# Patient Record
Sex: FEMALE | URBAN - METROPOLITAN AREA
[De-identification: names, ages, dates, MRNs, and addresses within clinical notes are randomized per-mention and may not be internally consistent; named-entity substitution may affect disease eponyms.]

---

## 2024-03-11 ENCOUNTER — NEW PATIENT COMPREHENSIVE (OUTPATIENT)
Dept: URBAN - METROPOLITAN AREA CLINIC 102 | Facility: CLINIC | Age: 59
End: 2024-03-11

## 2024-03-11 DIAGNOSIS — H40.033: ICD-10-CM

## 2024-03-11 DIAGNOSIS — H52.03: ICD-10-CM

## 2024-03-11 DIAGNOSIS — H04.123: ICD-10-CM

## 2024-03-11 DIAGNOSIS — H25.13: ICD-10-CM

## 2024-03-11 PROCEDURE — 92015 DETERMINE REFRACTIVE STATE: CPT

## 2024-03-11 PROCEDURE — 99204 OFFICE O/P NEW MOD 45 MIN: CPT

## 2024-03-11 ASSESSMENT — TONOMETRY
OD_IOP_MMHG: 25
OS_IOP_MMHG: 22

## 2024-03-11 ASSESSMENT — VISUAL ACUITY
OD_CC: 20/40+2
OS_CC: 20/30
OU_CC: 20/30

## 2024-10-14 ENCOUNTER — HOSPITAL ENCOUNTER (INPATIENT)
Facility: HOSPITAL | Age: 59
LOS: 7 days | Discharge: NURSING, REHAB OR LONG-TERM CARE | DRG: 682 | End: 2024-10-21
Attending: EMERGENCY MEDICINE | Admitting: INTERNAL MEDICINE
Payer: MEDICARE

## 2024-10-14 ENCOUNTER — APPOINTMENT (OUTPATIENT)
Facility: HOSPITAL | Age: 59
DRG: 682 | End: 2024-10-14
Payer: MEDICARE

## 2024-10-14 DIAGNOSIS — N17.9 AKI (ACUTE KIDNEY INJURY) (HCC): ICD-10-CM

## 2024-10-14 DIAGNOSIS — R62.7 FAILURE TO THRIVE IN ADULT: Primary | ICD-10-CM

## 2024-10-14 PROBLEM — E86.0 DEHYDRATION: Status: ACTIVE | Noted: 2024-10-14

## 2024-10-14 PROBLEM — R10.9 ABDOMINAL PAIN: Status: ACTIVE | Noted: 2024-10-14

## 2024-10-14 PROBLEM — R11.2 NAUSEA AND VOMITING: Status: ACTIVE | Noted: 2024-10-14

## 2024-10-14 PROBLEM — E87.20 ACIDOSIS: Status: ACTIVE | Noted: 2024-10-14

## 2024-10-14 PROBLEM — R63.4 WEIGHT LOSS: Status: ACTIVE | Noted: 2024-10-14

## 2024-10-14 PROBLEM — C53.9 CERVICAL CANCER (HCC): Status: ACTIVE | Noted: 2024-10-14

## 2024-10-14 PROBLEM — E87.1 HYPONATREMIA: Status: ACTIVE | Noted: 2024-10-14

## 2024-10-14 PROBLEM — D72.829 LEUKOCYTOSIS: Status: ACTIVE | Noted: 2024-10-14

## 2024-10-14 PROBLEM — R53.81 DEBILITY: Status: ACTIVE | Noted: 2024-10-14

## 2024-10-14 PROBLEM — Z98.890 HISTORY OF UROSTOMY: Status: ACTIVE | Noted: 2024-10-14

## 2024-10-14 PROBLEM — N39.0 UTI (URINARY TRACT INFECTION): Status: ACTIVE | Noted: 2024-10-14

## 2024-10-14 LAB
ALBUMIN SERPL-MCNC: 2.9 G/DL (ref 3.5–5)
ALBUMIN/GLOB SERPL: 0.5 (ref 1.1–2.2)
ALP SERPL-CCNC: 120 U/L (ref 45–117)
ALT SERPL-CCNC: 47 U/L (ref 12–78)
ANION GAP SERPL CALC-SCNC: 10 MMOL/L (ref 2–12)
ANION GAP SERPL CALC-SCNC: 15 MMOL/L (ref 2–12)
APPEARANCE UR: ABNORMAL
AST SERPL-CCNC: 29 U/L (ref 15–37)
BACTERIA URNS QL MICRO: ABNORMAL /HPF
BASOPHILS # BLD: 0 K/UL (ref 0–0.1)
BASOPHILS NFR BLD: 0 % (ref 0–1)
BILIRUB SERPL-MCNC: 0.4 MG/DL (ref 0.2–1)
BILIRUB UR QL: NEGATIVE
BUN SERPL-MCNC: 63 MG/DL (ref 6–20)
BUN SERPL-MCNC: 66 MG/DL (ref 6–20)
BUN/CREAT SERPL: 47 (ref 12–20)
BUN/CREAT SERPL: 53 (ref 12–20)
CALCIUM SERPL-MCNC: 11.1 MG/DL (ref 8.5–10.1)
CALCIUM SERPL-MCNC: 9.8 MG/DL (ref 8.5–10.1)
CHLORIDE SERPL-SCNC: 100 MMOL/L (ref 97–108)
CHLORIDE SERPL-SCNC: 106 MMOL/L (ref 97–108)
CO2 SERPL-SCNC: 18 MMOL/L (ref 21–32)
CO2 SERPL-SCNC: 20 MMOL/L (ref 21–32)
COLOR UR: ABNORMAL
COMMENT:: NORMAL
CREAT SERPL-MCNC: 1.2 MG/DL (ref 0.55–1.02)
CREAT SERPL-MCNC: 1.41 MG/DL (ref 0.55–1.02)
DIFFERENTIAL METHOD BLD: ABNORMAL
EOSINOPHIL # BLD: 0 K/UL (ref 0–0.4)
EOSINOPHIL NFR BLD: 0 % (ref 0–7)
EPITH CASTS URNS QL MICRO: ABNORMAL /LPF
ERYTHROCYTE [DISTWIDTH] IN BLOOD BY AUTOMATED COUNT: 13.4 % (ref 11.5–14.5)
GLOBULIN SER CALC-MCNC: 6.4 G/DL (ref 2–4)
GLUCOSE SERPL-MCNC: 118 MG/DL (ref 65–100)
GLUCOSE SERPL-MCNC: 97 MG/DL (ref 65–100)
GLUCOSE UR STRIP.AUTO-MCNC: NEGATIVE MG/DL
HCT VFR BLD AUTO: 40 % (ref 35–47)
HGB BLD-MCNC: 13.4 G/DL (ref 11.5–16)
HGB UR QL STRIP: NEGATIVE
HYALINE CASTS URNS QL MICRO: ABNORMAL /LPF (ref 0–5)
IMM GRANULOCYTES # BLD AUTO: 0.1 K/UL (ref 0–0.04)
IMM GRANULOCYTES NFR BLD AUTO: 1 % (ref 0–0.5)
KETONES UR QL STRIP.AUTO: NEGATIVE MG/DL
LEUKOCYTE ESTERASE UR QL STRIP.AUTO: ABNORMAL
LIPASE SERPL-CCNC: 37 U/L (ref 13–75)
LYMPHOCYTES # BLD: 1.3 K/UL (ref 0.8–3.5)
LYMPHOCYTES NFR BLD: 11 % (ref 12–49)
MAGNESIUM SERPL-MCNC: 2.3 MG/DL (ref 1.6–2.4)
MCH RBC QN AUTO: 29.3 PG (ref 26–34)
MCHC RBC AUTO-ENTMCNC: 33.5 G/DL (ref 30–36.5)
MCV RBC AUTO: 87.3 FL (ref 80–99)
MONOCYTES # BLD: 0.9 K/UL (ref 0–1)
MONOCYTES NFR BLD: 7 % (ref 5–13)
NEUTS SEG # BLD: 9.5 K/UL (ref 1.8–8)
NEUTS SEG NFR BLD: 81 % (ref 32–75)
NITRITE UR QL STRIP.AUTO: NEGATIVE
NRBC # BLD: 0 K/UL (ref 0–0.01)
NRBC BLD-RTO: 0 PER 100 WBC
PH UR STRIP: 5.5 (ref 5–8)
PLATELET # BLD AUTO: 548 K/UL (ref 150–400)
PMV BLD AUTO: 9.2 FL (ref 8.9–12.9)
POTASSIUM SERPL-SCNC: 3.4 MMOL/L (ref 3.5–5.1)
POTASSIUM SERPL-SCNC: 3.5 MMOL/L (ref 3.5–5.1)
PROT SERPL-MCNC: 9.3 G/DL (ref 6.4–8.2)
PROT UR STRIP-MCNC: ABNORMAL MG/DL
RBC # BLD AUTO: 4.58 M/UL (ref 3.8–5.2)
RBC #/AREA URNS HPF: ABNORMAL /HPF (ref 0–5)
SODIUM SERPL-SCNC: 133 MMOL/L (ref 136–145)
SODIUM SERPL-SCNC: 136 MMOL/L (ref 136–145)
SP GR UR REFRACTOMETRY: 1.01 (ref 1–1.03)
SPECIMEN HOLD: NORMAL
URINE CULTURE IF INDICATED: ABNORMAL
UROBILINOGEN UR QL STRIP.AUTO: 0.2 EU/DL (ref 0.2–1)
WBC # BLD AUTO: 11.8 K/UL (ref 3.6–11)
WBC URNS QL MICRO: ABNORMAL /HPF (ref 0–4)

## 2024-10-14 PROCEDURE — 6360000004 HC RX CONTRAST MEDICATION: Performed by: EMERGENCY MEDICINE

## 2024-10-14 PROCEDURE — 93005 ELECTROCARDIOGRAM TRACING: CPT | Performed by: EMERGENCY MEDICINE

## 2024-10-14 PROCEDURE — 96374 THER/PROPH/DIAG INJ IV PUSH: CPT

## 2024-10-14 PROCEDURE — 80053 COMPREHEN METABOLIC PANEL: CPT

## 2024-10-14 PROCEDURE — 1100000000 HC RM PRIVATE

## 2024-10-14 PROCEDURE — 74177 CT ABD & PELVIS W/CONTRAST: CPT

## 2024-10-14 PROCEDURE — 87088 URINE BACTERIA CULTURE: CPT

## 2024-10-14 PROCEDURE — 94761 N-INVAS EAR/PLS OXIMETRY MLT: CPT

## 2024-10-14 PROCEDURE — 96361 HYDRATE IV INFUSION ADD-ON: CPT

## 2024-10-14 PROCEDURE — 2580000003 HC RX 258: Performed by: EMERGENCY MEDICINE

## 2024-10-14 PROCEDURE — 2580000003 HC RX 258: Performed by: INTERNAL MEDICINE

## 2024-10-14 PROCEDURE — 36415 COLL VENOUS BLD VENIPUNCTURE: CPT

## 2024-10-14 PROCEDURE — 83690 ASSAY OF LIPASE: CPT

## 2024-10-14 PROCEDURE — 81001 URINALYSIS AUTO W/SCOPE: CPT

## 2024-10-14 PROCEDURE — 87086 URINE CULTURE/COLONY COUNT: CPT

## 2024-10-14 PROCEDURE — 6360000002 HC RX W HCPCS: Performed by: EMERGENCY MEDICINE

## 2024-10-14 PROCEDURE — 87186 SC STD MICRODIL/AGAR DIL: CPT

## 2024-10-14 PROCEDURE — 6360000002 HC RX W HCPCS: Performed by: INTERNAL MEDICINE

## 2024-10-14 PROCEDURE — 85025 COMPLETE CBC W/AUTO DIFF WBC: CPT

## 2024-10-14 PROCEDURE — 96375 TX/PRO/DX INJ NEW DRUG ADDON: CPT

## 2024-10-14 PROCEDURE — 99285 EMERGENCY DEPT VISIT HI MDM: CPT

## 2024-10-14 PROCEDURE — 83735 ASSAY OF MAGNESIUM: CPT

## 2024-10-14 RX ORDER — SODIUM CHLORIDE 0.9 % (FLUSH) 0.9 %
5-40 SYRINGE (ML) INJECTION PRN
Status: DISCONTINUED | OUTPATIENT
Start: 2024-10-14 | End: 2024-10-21 | Stop reason: HOSPADM

## 2024-10-14 RX ORDER — ONDANSETRON 4 MG/1
4 TABLET, ORALLY DISINTEGRATING ORAL EVERY 8 HOURS PRN
Status: DISCONTINUED | OUTPATIENT
Start: 2024-10-14 | End: 2024-10-21 | Stop reason: HOSPADM

## 2024-10-14 RX ORDER — LEVOFLOXACIN 5 MG/ML
500 INJECTION, SOLUTION INTRAVENOUS ONCE
Status: COMPLETED | OUTPATIENT
Start: 2024-10-14 | End: 2024-10-14

## 2024-10-14 RX ORDER — METOCLOPRAMIDE HYDROCHLORIDE 5 MG/ML
10 INJECTION INTRAMUSCULAR; INTRAVENOUS EVERY 6 HOURS
Status: DISCONTINUED | OUTPATIENT
Start: 2024-10-14 | End: 2024-10-15

## 2024-10-14 RX ORDER — SODIUM CHLORIDE 9 MG/ML
INJECTION, SOLUTION INTRAVENOUS PRN
Status: DISCONTINUED | OUTPATIENT
Start: 2024-10-14 | End: 2024-10-21 | Stop reason: HOSPADM

## 2024-10-14 RX ORDER — MAGNESIUM SULFATE IN WATER 40 MG/ML
2000 INJECTION, SOLUTION INTRAVENOUS PRN
Status: DISCONTINUED | OUTPATIENT
Start: 2024-10-14 | End: 2024-10-21 | Stop reason: HOSPADM

## 2024-10-14 RX ORDER — ACETAMINOPHEN 325 MG/1
650 TABLET ORAL EVERY 6 HOURS PRN
Status: DISCONTINUED | OUTPATIENT
Start: 2024-10-14 | End: 2024-10-21 | Stop reason: HOSPADM

## 2024-10-14 RX ORDER — SODIUM CHLORIDE 0.9 % (FLUSH) 0.9 %
5-40 SYRINGE (ML) INJECTION EVERY 12 HOURS SCHEDULED
Status: DISCONTINUED | OUTPATIENT
Start: 2024-10-14 | End: 2024-10-21 | Stop reason: HOSPADM

## 2024-10-14 RX ORDER — LEVOFLOXACIN 5 MG/ML
250 INJECTION, SOLUTION INTRAVENOUS EVERY 24 HOURS
Status: DISCONTINUED | OUTPATIENT
Start: 2024-10-15 | End: 2024-10-17

## 2024-10-14 RX ORDER — IOPAMIDOL 755 MG/ML
100 INJECTION, SOLUTION INTRAVASCULAR
Status: COMPLETED | OUTPATIENT
Start: 2024-10-14 | End: 2024-10-14

## 2024-10-14 RX ORDER — LEVOFLOXACIN 5 MG/ML
500 INJECTION, SOLUTION INTRAVENOUS EVERY 24 HOURS
Status: DISCONTINUED | OUTPATIENT
Start: 2024-10-14 | End: 2024-10-14

## 2024-10-14 RX ORDER — HEPARIN SODIUM 5000 [USP'U]/ML
5000 INJECTION, SOLUTION INTRAVENOUS; SUBCUTANEOUS 2 TIMES DAILY
Status: DISCONTINUED | OUTPATIENT
Start: 2024-10-14 | End: 2024-10-21 | Stop reason: HOSPADM

## 2024-10-14 RX ORDER — KETOROLAC TROMETHAMINE 15 MG/ML
15 INJECTION, SOLUTION INTRAMUSCULAR; INTRAVENOUS ONCE
Status: COMPLETED | OUTPATIENT
Start: 2024-10-14 | End: 2024-10-14

## 2024-10-14 RX ORDER — POTASSIUM CHLORIDE 750 MG/1
40 TABLET, EXTENDED RELEASE ORAL PRN
Status: DISCONTINUED | OUTPATIENT
Start: 2024-10-14 | End: 2024-10-21 | Stop reason: HOSPADM

## 2024-10-14 RX ORDER — SODIUM CHLORIDE 9 MG/ML
INJECTION, SOLUTION INTRAVENOUS CONTINUOUS
Status: DISPENSED | OUTPATIENT
Start: 2024-10-14 | End: 2024-10-15

## 2024-10-14 RX ORDER — ENOXAPARIN SODIUM 100 MG/ML
40 INJECTION SUBCUTANEOUS DAILY
Status: DISCONTINUED | OUTPATIENT
Start: 2024-10-14 | End: 2024-10-14

## 2024-10-14 RX ORDER — 0.9 % SODIUM CHLORIDE 0.9 %
1000 INTRAVENOUS SOLUTION INTRAVENOUS ONCE
Status: COMPLETED | OUTPATIENT
Start: 2024-10-14 | End: 2024-10-14

## 2024-10-14 RX ORDER — ONDANSETRON 2 MG/ML
4 INJECTION INTRAMUSCULAR; INTRAVENOUS EVERY 6 HOURS PRN
Status: DISCONTINUED | OUTPATIENT
Start: 2024-10-14 | End: 2024-10-21 | Stop reason: HOSPADM

## 2024-10-14 RX ORDER — POLYETHYLENE GLYCOL 3350 17 G/17G
17 POWDER, FOR SOLUTION ORAL DAILY PRN
Status: DISCONTINUED | OUTPATIENT
Start: 2024-10-14 | End: 2024-10-21 | Stop reason: HOSPADM

## 2024-10-14 RX ORDER — ACETAMINOPHEN 650 MG/1
650 SUPPOSITORY RECTAL EVERY 6 HOURS PRN
Status: DISCONTINUED | OUTPATIENT
Start: 2024-10-14 | End: 2024-10-21 | Stop reason: HOSPADM

## 2024-10-14 RX ORDER — POTASSIUM CHLORIDE 7.45 MG/ML
10 INJECTION INTRAVENOUS PRN
Status: DISCONTINUED | OUTPATIENT
Start: 2024-10-14 | End: 2024-10-21 | Stop reason: HOSPADM

## 2024-10-14 RX ORDER — ONDANSETRON 2 MG/ML
4 INJECTION INTRAMUSCULAR; INTRAVENOUS ONCE
Status: COMPLETED | OUTPATIENT
Start: 2024-10-14 | End: 2024-10-14

## 2024-10-14 RX ADMIN — ONDANSETRON 4 MG: 2 INJECTION INTRAMUSCULAR; INTRAVENOUS at 14:10

## 2024-10-14 RX ADMIN — IOPAMIDOL 100 ML: 755 INJECTION, SOLUTION INTRAVENOUS at 13:57

## 2024-10-14 RX ADMIN — SODIUM CHLORIDE: 9 INJECTION, SOLUTION INTRAVENOUS at 18:39

## 2024-10-14 RX ADMIN — LEVOFLOXACIN 500 MG: 500 INJECTION, SOLUTION INTRAVENOUS at 18:45

## 2024-10-14 RX ADMIN — METOCLOPRAMIDE HYDROCHLORIDE 10 MG: 5 INJECTION INTRAMUSCULAR; INTRAVENOUS at 18:39

## 2024-10-14 RX ADMIN — KETOROLAC TROMETHAMINE 15 MG: 15 INJECTION, SOLUTION INTRAMUSCULAR; INTRAVENOUS at 14:07

## 2024-10-14 RX ADMIN — SODIUM CHLORIDE 1000 ML: 9 INJECTION, SOLUTION INTRAVENOUS at 14:07

## 2024-10-14 ASSESSMENT — PAIN DESCRIPTION - LOCATION: LOCATION: ABDOMEN;BACK

## 2024-10-14 ASSESSMENT — PAIN DESCRIPTION - ORIENTATION: ORIENTATION: LOWER

## 2024-10-14 ASSESSMENT — PAIN SCALES - GENERAL
PAINLEVEL_OUTOF10: 0
PAINLEVEL_OUTOF10: 10

## 2024-10-14 ASSESSMENT — PAIN DESCRIPTION - DESCRIPTORS: DESCRIPTORS: SHARP

## 2024-10-14 ASSESSMENT — PAIN - FUNCTIONAL ASSESSMENT: PAIN_FUNCTIONAL_ASSESSMENT: 0-10

## 2024-10-14 NOTE — H&P
[facility]  [address] [place], [place]  [id number] [phone]    Hospital Medicine Admission History and Physical      NAME:  Fouzia Castillo   :   1965   MRN:  [id number]     PCP:  No primary care provider on file.     Date of service:  10/14/2024         Subjective:     CHIEF COMPLAINT: Abdominal pain, poor p.o. intake, vomiting for 1 month    HISTORY OF PRESENT ILLNESS:     Ms. Castillo is a 59 y.o.   female who is admitted with dehydration.  Ms. Castillo's past medical history of cervical cancer s/p colostomy and urostomy in 2024 presented to ER complaining of abdominal pain, poor p.o. intake, vomiting and weight loss for 1 month.  Patient has not been eating or drinking much for the last 1 months.  Has been vomiting associated with generalized abdominal pain.  Patient is very weak has difficulty walking.      No past medical history on file.     No past surgical history on file.    Social History     Tobacco Use    Smoking status: Not on file    Smokeless tobacco: Not on file   Substance Use Topics    Alcohol use: Not on file        No family history on file.     Allergies   Allergen Reactions    Latex Hives    Pcn [Penicillins] Hives        Prior to Admission medications    Not on File         Review of Systems:  (bold if positive, if negative)    Gen:  weight lossEyes:  ENT:  CVS:  Pulm:  GI:  Abdominal pain, nausea, emesisGU:  MS:  Skin:  Psych:  Endo:  Hem:  Renal:  Neuro:            Objective:      VITALS:    Vital signs reviewed; most recent are:    Vitals:    10/14/24 1134   BP: 105/64   Pulse: 91   Resp: 20   Temp: 97.4 °F (36.3 °C)   SpO2: 98%     SpO2 Readings from Last 6 Encounters:   10/14/24 98%        No intake or output data in the 24 hours ending 10/14/24 9165         Exam:     Physical Exam:    Gen:  malnourished, in no acute distress  HEENT:  Pink conjunctivae, PERRL, hearing intact to voice, moist mucous membranes  Neck:  Supple,

## 2024-10-14 NOTE — ED PROVIDER NOTES
[facility] EMERGENCY DEPT  EMERGENCY DEPARTMENT ENCOUNTER      Pt Name: Fouzia Castillo  MRN: [id number]  Birthdate 1965  Date of evaluation: 10/14/2024  Provider: Avelina Weston DO    CHIEF COMPLAINT       Chief Complaint   Patient presents with    Weight Loss    Fatigue         HISTORY OF PRESENT ILLNESS   (Location/Symptom, Timing/Onset, Context/Setting, Quality, Duration, Modifying Factors, Severity)  Note limiting factors.   The history is provided by a relative and the patient.       Pt is a 58 yo F who presents with failure to thrive.     Review of External Medical Records:     Nursing Notes were reviewed.    REVIEW OF SYSTEMS    (2-9 systems for level 4, 10 or more for level 5)     Review of Systems    Except as noted above the remainder of the review of systems was reviewed and negative.       PAST MEDICAL HISTORY   No past medical history on file.      SURGICAL HISTORY     No past surgical history on file.      CURRENT MEDICATIONS       Previous Medications    No medications on file       ALLERGIES     Latex and Pcn [penicillins]    FAMILY HISTORY     No family history on file.       SOCIAL HISTORY       Social History     Socioeconomic History    Marital status: Unknown     Social Determinants of Health     Food Insecurity: No Food Insecurity (8/1/2024)    [facility]    Hunger Vital Sign     Within the past 12 months, you worried that your food would run out before you got the money to buy more.: Never true   Transportation Needs: Unknown (8/1/2024)    Received from [facility]    PRAPARE - Transportation     Lack of Transportation (Non-Medical): No   Intimate Partner Violence: Unknown (8/1/2024)    Received from [facility]    Humiliation, Afraid, Rape, and Kick questionnaire     Fear of Current or Ex-Partner: No   Housing Stability: Low Risk  (5/12/2024)    Received from [facility]

## 2024-10-14 NOTE — ED TRIAGE NOTES
Pt arrives to ED via POV in wheelchair c/o vomiting, generalized weakness, weight loss (30lbs in 6 weeks). Pt had surgery to place colostomy bag in July 2024 due to perforated bowel

## 2024-10-15 LAB
ANION GAP SERPL CALC-SCNC: 9 MMOL/L (ref 2–12)
BASOPHILS # BLD: 0 K/UL (ref 0–0.1)
BASOPHILS NFR BLD: 0 % (ref 0–1)
BUN SERPL-MCNC: 45 MG/DL (ref 6–20)
BUN/CREAT SERPL: 44 (ref 12–20)
CALCIUM SERPL-MCNC: 8.9 MG/DL (ref 8.5–10.1)
CHLORIDE SERPL-SCNC: 114 MMOL/L (ref 97–108)
CO2 SERPL-SCNC: 17 MMOL/L (ref 21–32)
CREAT SERPL-MCNC: 1.02 MG/DL (ref 0.55–1.02)
DIFFERENTIAL METHOD BLD: ABNORMAL
EOSINOPHIL # BLD: 0.1 K/UL (ref 0–0.4)
EOSINOPHIL NFR BLD: 1 % (ref 0–7)
ERYTHROCYTE [DISTWIDTH] IN BLOOD BY AUTOMATED COUNT: 13.6 % (ref 11.5–14.5)
GLUCOSE SERPL-MCNC: 142 MG/DL (ref 65–100)
HCT VFR BLD AUTO: 32.8 % (ref 35–47)
HGB BLD-MCNC: 10.8 G/DL (ref 11.5–16)
IMM GRANULOCYTES # BLD AUTO: 0.1 K/UL (ref 0–0.04)
IMM GRANULOCYTES NFR BLD AUTO: 1 % (ref 0–0.5)
LYMPHOCYTES # BLD: 0.8 K/UL (ref 0.8–3.5)
LYMPHOCYTES NFR BLD: 11 % (ref 12–49)
MAGNESIUM SERPL-MCNC: 1.5 MG/DL (ref 1.6–2.4)
MCH RBC QN AUTO: 28.8 PG (ref 26–34)
MCHC RBC AUTO-ENTMCNC: 32.9 G/DL (ref 30–36.5)
MCV RBC AUTO: 87.5 FL (ref 80–99)
MONOCYTES # BLD: 0.6 K/UL (ref 0–1)
MONOCYTES NFR BLD: 9 % (ref 5–13)
NEUTS SEG # BLD: 5.6 K/UL (ref 1.8–8)
NEUTS SEG NFR BLD: 78 % (ref 32–75)
NRBC # BLD: 0 K/UL (ref 0–0.01)
NRBC BLD-RTO: 0 PER 100 WBC
PLATELET # BLD AUTO: 374 K/UL (ref 150–400)
PMV BLD AUTO: 9.3 FL (ref 8.9–12.9)
POTASSIUM SERPL-SCNC: 3.2 MMOL/L (ref 3.5–5.1)
RBC # BLD AUTO: 3.75 M/UL (ref 3.8–5.2)
RBC MORPH BLD: ABNORMAL
SODIUM SERPL-SCNC: 140 MMOL/L (ref 136–145)
WBC # BLD AUTO: 7.2 K/UL (ref 3.6–11)

## 2024-10-15 PROCEDURE — 97165 OT EVAL LOW COMPLEX 30 MIN: CPT

## 2024-10-15 PROCEDURE — 6370000000 HC RX 637 (ALT 250 FOR IP)

## 2024-10-15 PROCEDURE — 85025 COMPLETE CBC W/AUTO DIFF WBC: CPT

## 2024-10-15 PROCEDURE — 6360000002 HC RX W HCPCS: Performed by: STUDENT IN AN ORGANIZED HEALTH CARE EDUCATION/TRAINING PROGRAM

## 2024-10-15 PROCEDURE — 1100000000 HC RM PRIVATE

## 2024-10-15 PROCEDURE — 6370000000 HC RX 637 (ALT 250 FOR IP): Performed by: INTERNAL MEDICINE

## 2024-10-15 PROCEDURE — 97161 PT EVAL LOW COMPLEX 20 MIN: CPT

## 2024-10-15 PROCEDURE — 6360000002 HC RX W HCPCS: Performed by: INTERNAL MEDICINE

## 2024-10-15 PROCEDURE — 80048 BASIC METABOLIC PNL TOTAL CA: CPT

## 2024-10-15 PROCEDURE — 97530 THERAPEUTIC ACTIVITIES: CPT

## 2024-10-15 PROCEDURE — 83735 ASSAY OF MAGNESIUM: CPT

## 2024-10-15 PROCEDURE — 97535 SELF CARE MNGMENT TRAINING: CPT

## 2024-10-15 PROCEDURE — 2580000003 HC RX 258: Performed by: INTERNAL MEDICINE

## 2024-10-15 PROCEDURE — 94761 N-INVAS EAR/PLS OXIMETRY MLT: CPT

## 2024-10-15 RX ORDER — LANOLIN ALCOHOL/MO/W.PET/CERES
3 CREAM (GRAM) TOPICAL NIGHTLY PRN
Status: DISCONTINUED | OUTPATIENT
Start: 2024-10-15 | End: 2024-10-20

## 2024-10-15 RX ORDER — TRAMADOL HYDROCHLORIDE 50 MG/1
25 TABLET ORAL ONCE
Status: COMPLETED | OUTPATIENT
Start: 2024-10-15 | End: 2024-10-15

## 2024-10-15 RX ORDER — METOCLOPRAMIDE HYDROCHLORIDE 5 MG/ML
5 INJECTION INTRAMUSCULAR; INTRAVENOUS EVERY 6 HOURS
Status: DISCONTINUED | OUTPATIENT
Start: 2024-10-15 | End: 2024-10-17

## 2024-10-15 RX ADMIN — METOCLOPRAMIDE HYDROCHLORIDE 5 MG: 5 INJECTION, SOLUTION INTRAMUSCULAR; INTRAVENOUS at 15:06

## 2024-10-15 RX ADMIN — HEPARIN SODIUM 5000 UNITS: 5000 INJECTION INTRAVENOUS; SUBCUTANEOUS at 08:22

## 2024-10-15 RX ADMIN — Medication 3 MG: at 20:50

## 2024-10-15 RX ADMIN — POTASSIUM BICARBONATE 40 MEQ: 782 TABLET, EFFERVESCENT ORAL at 19:00

## 2024-10-15 RX ADMIN — METOCLOPRAMIDE HYDROCHLORIDE 5 MG: 5 INJECTION, SOLUTION INTRAMUSCULAR; INTRAVENOUS at 10:20

## 2024-10-15 RX ADMIN — TRAMADOL HYDROCHLORIDE 25 MG: 50 TABLET ORAL at 23:25

## 2024-10-15 RX ADMIN — SODIUM CHLORIDE: 9 INJECTION, SOLUTION INTRAVENOUS at 15:08

## 2024-10-15 RX ADMIN — SODIUM CHLORIDE, PRESERVATIVE FREE 10 ML: 5 INJECTION INTRAVENOUS at 08:20

## 2024-10-15 RX ADMIN — SODIUM CHLORIDE: 9 INJECTION, SOLUTION INTRAVENOUS at 04:10

## 2024-10-15 RX ADMIN — LEVOFLOXACIN 250 MG: 250 INJECTION, SOLUTION INTRAVENOUS at 18:08

## 2024-10-15 ASSESSMENT — PAIN DESCRIPTION - LOCATION: LOCATION: GENERALIZED

## 2024-10-15 ASSESSMENT — PAIN SCALES - GENERAL
PAINLEVEL_OUTOF10: 0
PAINLEVEL_OUTOF10: 10
PAINLEVEL_OUTOF10: 0
PAINLEVEL_OUTOF10: 0

## 2024-10-15 ASSESSMENT — PAIN DESCRIPTION - DESCRIPTORS: DESCRIPTORS: ACHING

## 2024-10-15 ASSESSMENT — PAIN - FUNCTIONAL ASSESSMENT: PAIN_FUNCTIONAL_ASSESSMENT: PREVENTS OR INTERFERES SOME ACTIVE ACTIVITIES AND ADLS

## 2024-10-15 ASSESSMENT — PAIN DESCRIPTION - ORIENTATION: ORIENTATION: OTHER (COMMENT)

## 2024-10-15 NOTE — CONSULTS
colostomy.    Wounds: Wound Type: None      Anthropometric Measures:  Height: 152.4 cm (5')  Ideal Body Weight (IBW): 100 lbs (45 kg)       Current Body Weight: 35.8 kg (78 lb 14.8 oz), 78.9 % IBW. Weight Source: Stated  Current BMI (kg/m2): 15.4        Weight Adjustment For: No Adjustment                 BMI Categories: Underweight (BMI less than 18.5)    Wt Readings from Last 20 Encounters:   10/14/24 35.8 kg (79 lb)           Nutrition Diagnosis:   Unintended weight loss related to inadequate protein-energy intake as evidenced by poor intake prior to admission, BMI, weight loss  Severe malnutrition related to inadequate protein-energy intake as evidenced by Criteria as identified in malnutrition assessment, weight loss, BMI    Nutrition Interventions:   Food and/or Nutrient Delivery: Start Oral Nutrition Supplement, Modify Current Diet  Nutrition Education/Counseling: No recommendation at this time  Coordination of Nutrition Care: Continue to monitor while inpatient, Interdisciplinary Rounds       Goals:     Goals: PO intake 50% or greater, prior to discharge       Nutrition Monitoring and Evaluation:   Behavioral-Environmental Outcomes: Beliefs and Attitudes, Readiness for Change  Food/Nutrient Intake Outcomes: Food and Nutrient Intake, Supplement Intake, Diet Advancement/Tolerance  Physical Signs/Symptoms Outcomes: Biochemical Data, Weight, GI Status    Discharge Planning:    Too soon to determine     Daniel Hernandez RD  Available via [employer]  Office # [phone]

## 2024-10-15 NOTE — CONSULTS
Assessment:     59 year old female with question of early cholecystitis due to gallbladder mural thickening. CT with mildly thickened gallbladder wall. No elevation in bilirubin. Non tender on exam this morning. Reviewed outside records.     Plan:     No indication for surgical intervention at this time   PT/OT  Diet as tolerates   Pain/nausea control    Signed By: Talya Woods PA-C  [facility]  [phone]    October 15, 2024          General Surgery Consult    Subjective:      Fouzia Castillo is a 59 y.o.  female who presents with JE, dehydration, and poor PO intake. Asked to see patient by Dr. Moctezuma for mural thickening of gallbladder. Most of history is obtained from patient's chart due to fatigue and no family members at bedside. Patient has history of ex lap with ariel patch repair of gastric perforation on 6/25. Additional history of cervical cancer with rectovaginal fistula, vesicovaginal fistula, and urethrovaginal fistula s/p exploratory laparotomy, lysis of adhesions, urinary conduit and placement of bilateral ureteral stents, closure of vaginal fistula, and end colostomy on 7/31/24 at outside hospital. Upon review of records, she had post operative course complicated by pain control and ileus. She required TPN during admission. She had MEERA drains removed prior to discharge. On 8/9/24 she had a CT scan which showed dilated CBD and pancreatic duct. MRCP was performed. GI evaluated patient at that time and did not feel she required immediate intervention. She presented to the emergency department here yesterday where CT showed mural thickening of the gallbladder - could represent early developing cholecystitis. Labs revealed normal total bilirubin of 0.4. ALP is elevated to 120. AST and ALT are normal. She had leukocytosis of 11.8. She was admitted for failure to thrive. Today, patient feels tired. She is denying abdominal pain, nausea, or vomiting. She is not interested in further

## 2024-10-16 PROBLEM — E43 SEVERE PROTEIN-CALORIE MALNUTRITION (HCC): Status: ACTIVE | Noted: 2024-10-16

## 2024-10-16 LAB
ANION GAP SERPL CALC-SCNC: 6 MMOL/L (ref 2–12)
B PERT DNA SPEC QL NAA+PROBE: NOT DETECTED
BASOPHILS # BLD: 0 K/UL (ref 0–0.1)
BASOPHILS NFR BLD: 0 % (ref 0–1)
BORDETELLA PARAPERTUSSIS BY PCR: NOT DETECTED
BUN SERPL-MCNC: 32 MG/DL (ref 6–20)
BUN/CREAT SERPL: 37 (ref 12–20)
C PNEUM DNA SPEC QL NAA+PROBE: NOT DETECTED
CALCIUM SERPL-MCNC: 9.2 MG/DL (ref 8.5–10.1)
CHLORIDE SERPL-SCNC: 116 MMOL/L (ref 97–108)
CO2 SERPL-SCNC: 17 MMOL/L (ref 21–32)
CREAT SERPL-MCNC: 0.87 MG/DL (ref 0.55–1.02)
DIFFERENTIAL METHOD BLD: ABNORMAL
EKG ATRIAL RATE: 90 BPM
EKG DIAGNOSIS: NORMAL
EKG P AXIS: 68 DEGREES
EKG P-R INTERVAL: 154 MS
EKG Q-T INTERVAL: 400 MS
EKG QRS DURATION: 76 MS
EKG QTC CALCULATION (BAZETT): 489 MS
EKG R AXIS: -35 DEGREES
EKG T AXIS: 85 DEGREES
EKG VENTRICULAR RATE: 90 BPM
EOSINOPHIL # BLD: 0.2 K/UL (ref 0–0.4)
EOSINOPHIL NFR BLD: 2 % (ref 0–7)
ERYTHROCYTE [DISTWIDTH] IN BLOOD BY AUTOMATED COUNT: 13.8 % (ref 11.5–14.5)
FLUAV SUBTYP SPEC NAA+PROBE: NOT DETECTED
FLUBV RNA SPEC QL NAA+PROBE: NOT DETECTED
GLUCOSE SERPL-MCNC: 93 MG/DL (ref 65–100)
HADV DNA SPEC QL NAA+PROBE: NOT DETECTED
HCOV 229E RNA SPEC QL NAA+PROBE: NOT DETECTED
HCOV HKU1 RNA SPEC QL NAA+PROBE: NOT DETECTED
HCOV NL63 RNA SPEC QL NAA+PROBE: NOT DETECTED
HCOV OC43 RNA SPEC QL NAA+PROBE: NOT DETECTED
HCT VFR BLD AUTO: 33 % (ref 35–47)
HGB BLD-MCNC: 11 G/DL (ref 11.5–16)
HMPV RNA SPEC QL NAA+PROBE: NOT DETECTED
HPIV1 RNA SPEC QL NAA+PROBE: NOT DETECTED
HPIV2 RNA SPEC QL NAA+PROBE: NOT DETECTED
HPIV3 RNA SPEC QL NAA+PROBE: NOT DETECTED
HPIV4 RNA SPEC QL NAA+PROBE: NOT DETECTED
IMM GRANULOCYTES # BLD AUTO: 0.1 K/UL (ref 0–0.04)
IMM GRANULOCYTES NFR BLD AUTO: 1 % (ref 0–0.5)
LYMPHOCYTES # BLD: 1.9 K/UL (ref 0.8–3.5)
LYMPHOCYTES NFR BLD: 22 % (ref 12–49)
M PNEUMO DNA SPEC QL NAA+PROBE: NOT DETECTED
MAGNESIUM SERPL-MCNC: 1.5 MG/DL (ref 1.6–2.4)
MCH RBC QN AUTO: 29.5 PG (ref 26–34)
MCHC RBC AUTO-ENTMCNC: 33.3 G/DL (ref 30–36.5)
MCV RBC AUTO: 88.5 FL (ref 80–99)
MONOCYTES # BLD: 0.9 K/UL (ref 0–1)
MONOCYTES NFR BLD: 10 % (ref 5–13)
NEUTS SEG # BLD: 5.4 K/UL (ref 1.8–8)
NEUTS SEG NFR BLD: 65 % (ref 32–75)
NRBC # BLD: 0 K/UL (ref 0–0.01)
NRBC BLD-RTO: 0 PER 100 WBC
PHOSPHATE SERPL-MCNC: 2.8 MG/DL (ref 2.6–4.7)
PLATELET # BLD AUTO: 399 K/UL (ref 150–400)
PMV BLD AUTO: 9.4 FL (ref 8.9–12.9)
POTASSIUM SERPL-SCNC: 3.3 MMOL/L (ref 3.5–5.1)
RBC # BLD AUTO: 3.73 M/UL (ref 3.8–5.2)
RSV RNA SPEC QL NAA+PROBE: NOT DETECTED
RV+EV RNA SPEC QL NAA+PROBE: NOT DETECTED
SARS-COV-2 RNA RESP QL NAA+PROBE: NOT DETECTED
SODIUM SERPL-SCNC: 139 MMOL/L (ref 136–145)
WBC # BLD AUTO: 8.3 K/UL (ref 3.6–11)

## 2024-10-16 PROCEDURE — 0202U NFCT DS 22 TRGT SARS-COV-2: CPT

## 2024-10-16 PROCEDURE — 87493 C DIFF AMPLIFIED PROBE: CPT

## 2024-10-16 PROCEDURE — 85025 COMPLETE CBC W/AUTO DIFF WBC: CPT

## 2024-10-16 PROCEDURE — 1100000000 HC RM PRIVATE

## 2024-10-16 PROCEDURE — 94761 N-INVAS EAR/PLS OXIMETRY MLT: CPT

## 2024-10-16 PROCEDURE — 83735 ASSAY OF MAGNESIUM: CPT

## 2024-10-16 PROCEDURE — 6360000002 HC RX W HCPCS: Performed by: INTERNAL MEDICINE

## 2024-10-16 PROCEDURE — 87046 STOOL CULTR AEROBIC BACT EA: CPT

## 2024-10-16 PROCEDURE — 2580000003 HC RX 258: Performed by: INTERNAL MEDICINE

## 2024-10-16 PROCEDURE — 6360000002 HC RX W HCPCS: Performed by: STUDENT IN AN ORGANIZED HEALTH CARE EDUCATION/TRAINING PROGRAM

## 2024-10-16 PROCEDURE — 6370000000 HC RX 637 (ALT 250 FOR IP)

## 2024-10-16 PROCEDURE — 93010 ELECTROCARDIOGRAM REPORT: CPT | Performed by: SPECIALIST

## 2024-10-16 PROCEDURE — 84100 ASSAY OF PHOSPHORUS: CPT

## 2024-10-16 PROCEDURE — 87449 NOS EACH ORGANISM AG IA: CPT

## 2024-10-16 PROCEDURE — 6370000000 HC RX 637 (ALT 250 FOR IP): Performed by: STUDENT IN AN ORGANIZED HEALTH CARE EDUCATION/TRAINING PROGRAM

## 2024-10-16 PROCEDURE — 87324 CLOSTRIDIUM AG IA: CPT

## 2024-10-16 PROCEDURE — 87506 IADNA-DNA/RNA PROBE TQ 6-11: CPT

## 2024-10-16 PROCEDURE — 87496 CYTOMEG DNA AMP PROBE: CPT

## 2024-10-16 PROCEDURE — 36415 COLL VENOUS BLD VENIPUNCTURE: CPT

## 2024-10-16 PROCEDURE — 80048 BASIC METABOLIC PNL TOTAL CA: CPT

## 2024-10-16 RX ORDER — MIRTAZAPINE 15 MG/1
7.5 TABLET, FILM COATED ORAL NIGHTLY
Status: DISCONTINUED | OUTPATIENT
Start: 2024-10-16 | End: 2024-10-20

## 2024-10-16 RX ADMIN — HEPARIN SODIUM 5000 UNITS: 5000 INJECTION INTRAVENOUS; SUBCUTANEOUS at 09:32

## 2024-10-16 RX ADMIN — ONDANSETRON 4 MG: 2 INJECTION INTRAMUSCULAR; INTRAVENOUS at 21:23

## 2024-10-16 RX ADMIN — METOCLOPRAMIDE HYDROCHLORIDE 5 MG: 5 INJECTION, SOLUTION INTRAMUSCULAR; INTRAVENOUS at 23:40

## 2024-10-16 RX ADMIN — SODIUM CHLORIDE, PRESERVATIVE FREE 10 ML: 5 INJECTION INTRAVENOUS at 09:30

## 2024-10-16 RX ADMIN — SODIUM CHLORIDE, PRESERVATIVE FREE 10 ML: 5 INJECTION INTRAVENOUS at 22:01

## 2024-10-16 RX ADMIN — METOCLOPRAMIDE HYDROCHLORIDE 5 MG: 5 INJECTION, SOLUTION INTRAMUSCULAR; INTRAVENOUS at 16:45

## 2024-10-16 RX ADMIN — METOCLOPRAMIDE HYDROCHLORIDE 5 MG: 5 INJECTION, SOLUTION INTRAMUSCULAR; INTRAVENOUS at 09:29

## 2024-10-16 RX ADMIN — MIRTAZAPINE 7.5 MG: 15 TABLET, FILM COATED ORAL at 22:01

## 2024-10-16 RX ADMIN — LEVOFLOXACIN 250 MG: 250 INJECTION, SOLUTION INTRAVENOUS at 16:47

## 2024-10-16 RX ADMIN — Medication 3 MG: at 22:01

## 2024-10-16 ASSESSMENT — PAIN SCALES - GENERAL
PAINLEVEL_OUTOF10: 10
PAINLEVEL_OUTOF10: 10

## 2024-10-16 NOTE — CONSULTS
Missy Bravo PA-C                       [phone] office             Monday-Friday 8:00 am-4:30 pm  I am not permitted to use \"perfect serve\" use above for contact, thanks.      Gastroenterology Consultation Note      Admit Date: 10/14/2024  Consult Date: 10/16/2024   I greatly appreciate your asking me to see Fouzia Castillo, thank you very much for the opportunity to participate in her care.    Narrative Assessment and Plan   59-year-old female with past medical history of cervical cancer with rectovaginal fistula, vesicovaginal fistula, and urethrovaginal fistula s/p exploratory laparotomy, lysis of adhesions, urinary conduit, placement of bilateral ureteral stents, closure of vaginal fistula, and end colostomy 7/31/2024.  Admitted to [facility] 10/14/2024 with JE secondary to nausea, vomiting, abdominal pain, and poor p.o. intake.  GI consulted to see for abdominal pain and diarrhea.    CT this admission with evidence of dilated CBD and pancreatic duct, previously demonstrated on MRCP 8/12/2024.  Some gallbladder wall thickening on CT imaging- assessed by general surgery with no need for current intervention.    Diarrhea is new onset yesterday, and seems to have picked up after reinitiating solid diet.    Impression:  Diarrhea  Poor p.o. intake secondary to nausea and vomiting-improving  Underweight with BMI of 15  Cervical cancer with recent surgery 7/31/2024    Plan:  Recommend continue with regular diet and high-protein, high-calorie oral nutrition supplement as tolerated.  Fecal calprotectin, fecal lactoferrin, CMV, C. difficile, enteric panel, and stool culture ordered for further assessment of abrupt, new onset, clinically significant diarrhea.  Double duct sign noted on imaging appears to be chronic and was also seen in August.  Bilirubin is within normal limits.  No current need for repeat MRCP or consideration of

## 2024-10-17 LAB
ANION GAP SERPL CALC-SCNC: 9 MMOL/L (ref 2–12)
BACTERIA SPEC CULT: ABNORMAL
BACTERIA SPEC CULT: ABNORMAL
BASOPHILS # BLD: 0 K/UL (ref 0–0.1)
BASOPHILS NFR BLD: 0 % (ref 0–1)
BUN SERPL-MCNC: 21 MG/DL (ref 6–20)
BUN/CREAT SERPL: 23 (ref 12–20)
C DIFF GDH STL QL: NEGATIVE
C DIFF TOX A+B STL QL IA: NEGATIVE
C DIFF TOXIN INTERPRETATION: NORMAL
CALCIUM SERPL-MCNC: 9.2 MG/DL (ref 8.5–10.1)
CC UR VC: ABNORMAL
CHLORIDE SERPL-SCNC: 117 MMOL/L (ref 97–108)
CO2 SERPL-SCNC: 18 MMOL/L (ref 21–32)
CREAT SERPL-MCNC: 0.91 MG/DL (ref 0.55–1.02)
DIFFERENTIAL METHOD BLD: ABNORMAL
EOSINOPHIL # BLD: 0.1 K/UL (ref 0–0.4)
EOSINOPHIL NFR BLD: 1 % (ref 0–7)
ERYTHROCYTE [DISTWIDTH] IN BLOOD BY AUTOMATED COUNT: 14 % (ref 11.5–14.5)
GLUCOSE SERPL-MCNC: 120 MG/DL (ref 65–100)
HCT VFR BLD AUTO: 32.5 % (ref 35–47)
HGB BLD-MCNC: 10.6 G/DL (ref 11.5–16)
IMM GRANULOCYTES # BLD AUTO: 0 K/UL (ref 0–0.04)
IMM GRANULOCYTES NFR BLD AUTO: 1 % (ref 0–0.5)
LYMPHOCYTES # BLD: 1.3 K/UL (ref 0.8–3.5)
LYMPHOCYTES NFR BLD: 24 % (ref 12–49)
MAGNESIUM SERPL-MCNC: 1.4 MG/DL (ref 1.6–2.4)
MCH RBC QN AUTO: 28.8 PG (ref 26–34)
MCHC RBC AUTO-ENTMCNC: 32.6 G/DL (ref 30–36.5)
MCV RBC AUTO: 88.3 FL (ref 80–99)
MONOCYTES # BLD: 0.5 K/UL (ref 0–1)
MONOCYTES NFR BLD: 9 % (ref 5–13)
NEUTS SEG # BLD: 3.7 K/UL (ref 1.8–8)
NEUTS SEG NFR BLD: 65 % (ref 32–75)
NRBC # BLD: 0 K/UL (ref 0–0.01)
NRBC BLD-RTO: 0 PER 100 WBC
PHOSPHATE SERPL-MCNC: 3.3 MG/DL (ref 2.6–4.7)
PLATELET # BLD AUTO: 376 K/UL (ref 150–400)
PMV BLD AUTO: 9.3 FL (ref 8.9–12.9)
POTASSIUM SERPL-SCNC: 3.6 MMOL/L (ref 3.5–5.1)
RBC # BLD AUTO: 3.68 M/UL (ref 3.8–5.2)
SERVICE CMNT-IMP: ABNORMAL
SODIUM SERPL-SCNC: 144 MMOL/L (ref 136–145)
WBC # BLD AUTO: 5.7 K/UL (ref 3.6–11)

## 2024-10-17 PROCEDURE — 83735 ASSAY OF MAGNESIUM: CPT

## 2024-10-17 PROCEDURE — 80048 BASIC METABOLIC PNL TOTAL CA: CPT

## 2024-10-17 PROCEDURE — 94761 N-INVAS EAR/PLS OXIMETRY MLT: CPT

## 2024-10-17 PROCEDURE — 6370000000 HC RX 637 (ALT 250 FOR IP): Performed by: STUDENT IN AN ORGANIZED HEALTH CARE EDUCATION/TRAINING PROGRAM

## 2024-10-17 PROCEDURE — 97535 SELF CARE MNGMENT TRAINING: CPT

## 2024-10-17 PROCEDURE — 85025 COMPLETE CBC W/AUTO DIFF WBC: CPT

## 2024-10-17 PROCEDURE — 6360000002 HC RX W HCPCS: Performed by: STUDENT IN AN ORGANIZED HEALTH CARE EDUCATION/TRAINING PROGRAM

## 2024-10-17 PROCEDURE — 2580000003 HC RX 258: Performed by: STUDENT IN AN ORGANIZED HEALTH CARE EDUCATION/TRAINING PROGRAM

## 2024-10-17 PROCEDURE — 84100 ASSAY OF PHOSPHORUS: CPT

## 2024-10-17 PROCEDURE — 1100000000 HC RM PRIVATE

## 2024-10-17 PROCEDURE — 6360000002 HC RX W HCPCS

## 2024-10-17 PROCEDURE — 6360000002 HC RX W HCPCS: Performed by: INTERNAL MEDICINE

## 2024-10-17 PROCEDURE — 97530 THERAPEUTIC ACTIVITIES: CPT

## 2024-10-17 PROCEDURE — 2580000003 HC RX 258

## 2024-10-17 PROCEDURE — 6370000000 HC RX 637 (ALT 250 FOR IP)

## 2024-10-17 RX ORDER — LEVOFLOXACIN 5 MG/ML
250 INJECTION, SOLUTION INTRAVENOUS EVERY 24 HOURS
Status: DISCONTINUED | OUTPATIENT
Start: 2024-10-17 | End: 2024-10-18

## 2024-10-17 RX ORDER — MAGNESIUM SULFATE IN WATER 40 MG/ML
2000 INJECTION, SOLUTION INTRAVENOUS ONCE
Status: COMPLETED | OUTPATIENT
Start: 2024-10-17 | End: 2024-10-17

## 2024-10-17 RX ORDER — ONDANSETRON 2 MG/ML
4 INJECTION INTRAMUSCULAR; INTRAVENOUS EVERY 12 HOURS SCHEDULED
Status: DISCONTINUED | OUTPATIENT
Start: 2024-10-17 | End: 2024-10-20

## 2024-10-17 RX ADMIN — PANTOPRAZOLE SODIUM 40 MG: 40 INJECTION, POWDER, FOR SOLUTION INTRAVENOUS at 09:01

## 2024-10-17 RX ADMIN — ONDANSETRON 4 MG: 2 INJECTION INTRAMUSCULAR; INTRAVENOUS at 20:43

## 2024-10-17 RX ADMIN — Medication 3 MG: at 21:03

## 2024-10-17 RX ADMIN — LEVOFLOXACIN 250 MG: 250 INJECTION, SOLUTION INTRAVENOUS at 17:42

## 2024-10-17 RX ADMIN — CEFTRIAXONE 1000 MG: 1 INJECTION, POWDER, FOR SOLUTION INTRAMUSCULAR; INTRAVENOUS at 15:54

## 2024-10-17 RX ADMIN — ONDANSETRON 4 MG: 2 INJECTION INTRAMUSCULAR; INTRAVENOUS at 10:14

## 2024-10-17 RX ADMIN — HEPARIN SODIUM 5000 UNITS: 5000 INJECTION INTRAVENOUS; SUBCUTANEOUS at 09:01

## 2024-10-17 RX ADMIN — MAGNESIUM SULFATE HEPTAHYDRATE 2000 MG: 40 INJECTION, SOLUTION INTRAVENOUS at 19:42

## 2024-10-17 RX ADMIN — PANTOPRAZOLE SODIUM 40 MG: 40 INJECTION, POWDER, FOR SOLUTION INTRAVENOUS at 20:42

## 2024-10-17 RX ADMIN — METOCLOPRAMIDE HYDROCHLORIDE 5 MG: 5 INJECTION, SOLUTION INTRAMUSCULAR; INTRAVENOUS at 05:47

## 2024-10-17 RX ADMIN — MIRTAZAPINE 7.5 MG: 15 TABLET, FILM COATED ORAL at 20:42

## 2024-10-17 ASSESSMENT — PAIN SCALES - GENERAL
PAINLEVEL_OUTOF10: 0

## 2024-10-17 NOTE — CONSULTS
Comprehensive Nutrition Assessment    Type and Reason for Visit: Reassess    Nutrition Recommendations/Plan:   Change diet to Full liquid diet per GI  Continue Ensure PLUS hp   Recommend multivitamin        Malnutrition Assessment:  Malnutrition Status:  Severe malnutrition (10/15/24 1046)    Context:  Chronic Illness     Findings of the 6 clinical characteristics of malnutrition:  Energy Intake:  75% or less estimated energy requirements for 1 month or longer  Weight Loss:  Unable to assess     Body Fat Loss:  Severe body fat loss Orbital, Buccal region   Muscle Mass Loss:  Severe muscle mass loss Temples (temporalis), Hand (interosseous), Clavicles (pectoralis & deltoids)  Fluid Accumulation:  No significant fluid accumulation Extremities   Strength:  Not Performed       Nutrition Assessment:    Past medical hx: 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 From outside hospital discharge summary 8/16/24: Pt with \"rectovaginal fistula, vesicovaginal fistula, and urethrovaginal fistula s/p exploratory laparotomy, lysis of adhesions, urinary conduit and placement of bilateral ureteral stents, closure of vaginal fistula, and end colostomy on 7/31/24.\"      10/17: Pt reports eating most of her breakfast this morning. Lunch had not been eaten. Pt has Ensures on her bedside table. Pt requested cup of ice so she could pour her \"milk\" over it to drink it. By milk she meant Ensure. Pt prefers chocolate. Pt very frail/weak. Noted GI note indicates pt should be on Full liquid diet, but pt currently on Regular with ONS added.  Pt requesting RN assistance to change ostomy bag.       10/15: Pt was on TPN during that admission in July-August and transitioned to a regular diet.  \"Due to N/V, a CT scan done on 8/9/24 showed dilated common bile duct and main pancreatic duct, worsened since prior exam with abrupt transition point at level of ampulla. An MRCP performed and per GI evaluation was stable

## 2024-10-18 LAB
ANION GAP SERPL CALC-SCNC: 5 MMOL/L (ref 2–12)
BASOPHILS # BLD: 0 K/UL (ref 0–0.1)
BASOPHILS NFR BLD: 0 % (ref 0–1)
BUN SERPL-MCNC: 22 MG/DL (ref 6–20)
BUN/CREAT SERPL: 26 (ref 12–20)
CALCIUM SERPL-MCNC: 9.3 MG/DL (ref 8.5–10.1)
CHLORIDE SERPL-SCNC: 116 MMOL/L (ref 97–108)
CO2 SERPL-SCNC: 19 MMOL/L (ref 21–32)
CREAT SERPL-MCNC: 0.85 MG/DL (ref 0.55–1.02)
DIFFERENTIAL METHOD BLD: ABNORMAL
EOSINOPHIL # BLD: 0.1 K/UL (ref 0–0.4)
EOSINOPHIL NFR BLD: 2 % (ref 0–7)
ERYTHROCYTE [DISTWIDTH] IN BLOOD BY AUTOMATED COUNT: 13.9 % (ref 11.5–14.5)
GLUCOSE SERPL-MCNC: 103 MG/DL (ref 65–100)
HCT VFR BLD AUTO: 31.4 % (ref 35–47)
HGB BLD-MCNC: 10.3 G/DL (ref 11.5–16)
IMM GRANULOCYTES # BLD AUTO: 0.1 K/UL (ref 0–0.04)
IMM GRANULOCYTES NFR BLD AUTO: 1 % (ref 0–0.5)
LYMPHOCYTES # BLD: 2.1 K/UL (ref 0.8–3.5)
LYMPHOCYTES NFR BLD: 28 % (ref 12–49)
MAGNESIUM SERPL-MCNC: 1.5 MG/DL (ref 1.6–2.4)
MCH RBC QN AUTO: 28.8 PG (ref 26–34)
MCHC RBC AUTO-ENTMCNC: 32.8 G/DL (ref 30–36.5)
MCV RBC AUTO: 87.7 FL (ref 80–99)
MONOCYTES # BLD: 0.6 K/UL (ref 0–1)
MONOCYTES NFR BLD: 8 % (ref 5–13)
NEUTS SEG # BLD: 4.6 K/UL (ref 1.8–8)
NEUTS SEG NFR BLD: 61 % (ref 32–75)
NRBC # BLD: 0 K/UL (ref 0–0.01)
NRBC BLD-RTO: 0 PER 100 WBC
PHOSPHATE SERPL-MCNC: 4 MG/DL (ref 2.6–4.7)
PLATELET # BLD AUTO: 387 K/UL (ref 150–400)
PMV BLD AUTO: 9.1 FL (ref 8.9–12.9)
POTASSIUM SERPL-SCNC: 3.8 MMOL/L (ref 3.5–5.1)
RBC # BLD AUTO: 3.58 M/UL (ref 3.8–5.2)
SODIUM SERPL-SCNC: 140 MMOL/L (ref 136–145)
WBC # BLD AUTO: 7.5 K/UL (ref 3.6–11)

## 2024-10-18 PROCEDURE — 1100000000 HC RM PRIVATE

## 2024-10-18 PROCEDURE — 6370000000 HC RX 637 (ALT 250 FOR IP): Performed by: HOSPITALIST

## 2024-10-18 PROCEDURE — 6370000000 HC RX 637 (ALT 250 FOR IP)

## 2024-10-18 PROCEDURE — 97535 SELF CARE MNGMENT TRAINING: CPT

## 2024-10-18 PROCEDURE — 84100 ASSAY OF PHOSPHORUS: CPT

## 2024-10-18 PROCEDURE — 80048 BASIC METABOLIC PNL TOTAL CA: CPT

## 2024-10-18 PROCEDURE — 2580000003 HC RX 258: Performed by: INTERNAL MEDICINE

## 2024-10-18 PROCEDURE — 6360000002 HC RX W HCPCS

## 2024-10-18 PROCEDURE — 6360000002 HC RX W HCPCS: Performed by: HOSPITALIST

## 2024-10-18 PROCEDURE — 6360000002 HC RX W HCPCS: Performed by: INTERNAL MEDICINE

## 2024-10-18 PROCEDURE — 83735 ASSAY OF MAGNESIUM: CPT

## 2024-10-18 PROCEDURE — 2580000003 HC RX 258

## 2024-10-18 PROCEDURE — 85025 COMPLETE CBC W/AUTO DIFF WBC: CPT

## 2024-10-18 PROCEDURE — 6370000000 HC RX 637 (ALT 250 FOR IP): Performed by: STUDENT IN AN ORGANIZED HEALTH CARE EDUCATION/TRAINING PROGRAM

## 2024-10-18 RX ORDER — LEVOFLOXACIN 500 MG/1
250 TABLET, FILM COATED ORAL ONCE
Status: COMPLETED | OUTPATIENT
Start: 2024-10-18 | End: 2024-10-18

## 2024-10-18 RX ORDER — LANOLIN ALCOHOL/MO/W.PET/CERES
3 CREAM (GRAM) TOPICAL ONCE
Status: DISCONTINUED | OUTPATIENT
Start: 2024-10-18 | End: 2024-10-21 | Stop reason: HOSPADM

## 2024-10-18 RX ORDER — CEFUROXIME AXETIL 250 MG/1
250 TABLET ORAL EVERY 12 HOURS SCHEDULED
Status: DISCONTINUED | OUTPATIENT
Start: 2024-10-18 | End: 2024-10-21 | Stop reason: HOSPADM

## 2024-10-18 RX ORDER — MAGNESIUM SULFATE IN WATER 40 MG/ML
2000 INJECTION, SOLUTION INTRAVENOUS ONCE
Status: COMPLETED | OUTPATIENT
Start: 2024-10-18 | End: 2024-10-18

## 2024-10-18 RX ADMIN — SODIUM CHLORIDE, PRESERVATIVE FREE 10 ML: 5 INJECTION INTRAVENOUS at 08:04

## 2024-10-18 RX ADMIN — PANTOPRAZOLE SODIUM 40 MG: 40 INJECTION, POWDER, FOR SOLUTION INTRAVENOUS at 08:03

## 2024-10-18 RX ADMIN — MAGNESIUM SULFATE HEPTAHYDRATE 2000 MG: 40 INJECTION, SOLUTION INTRAVENOUS at 11:22

## 2024-10-18 RX ADMIN — PANTOPRAZOLE SODIUM 40 MG: 40 INJECTION, POWDER, FOR SOLUTION INTRAVENOUS at 20:48

## 2024-10-18 RX ADMIN — CEFUROXIME AXETIL 250 MG: 250 TABLET ORAL at 20:49

## 2024-10-18 RX ADMIN — ONDANSETRON 4 MG: 2 INJECTION INTRAMUSCULAR; INTRAVENOUS at 20:48

## 2024-10-18 RX ADMIN — HEPARIN SODIUM 5000 UNITS: 5000 INJECTION INTRAVENOUS; SUBCUTANEOUS at 08:03

## 2024-10-18 RX ADMIN — LEVOFLOXACIN 250 MG: 500 TABLET, FILM COATED ORAL at 16:39

## 2024-10-18 RX ADMIN — SODIUM CHLORIDE, PRESERVATIVE FREE 10 ML: 5 INJECTION INTRAVENOUS at 20:56

## 2024-10-18 RX ADMIN — ONDANSETRON 4 MG: 2 INJECTION INTRAMUSCULAR; INTRAVENOUS at 08:03

## 2024-10-18 ASSESSMENT — PAIN SCALES - GENERAL
PAINLEVEL_OUTOF10: 0

## 2024-10-18 NOTE — DISCHARGE INSTRUCTIONS
HOSPITALIST DISCHARGE INSTRUCTIONS  NAME: Fouzia Castillo   :  1965   MRN:  [id number]     Date/Time:  10/21/2024 12:47 PM    ADMIT DATE: 10/14/2024     DISCHARGE DATE: 10/21/2024     DISCHARGE DIAGNOSIS and DISCHARGE INSTRUCTIONS:    You are being discharged home today after an admission for diarrhea.  No obvious reasons for the diarrhea were noted.  Diarrhea subsiding at this time.  You are also treated for urinary tract infection with completion of antibiotics.  Please continue your home medications upon discharge.    Follow Up: Primary care physician.    MEDICATIONS:    It is important that you take the medication exactly as they are prescribed.   Keep your medication in the bottles provided by the pharmacist and keep a list of the medication names, dosages, and times to be taken in your wallet.   Do not take other medications without consulting your doctor.     Current Discharge Medication List        START taking these medications    Details   pantoprazole (PROTONIX) 40 MG tablet Take 1 tablet by mouth 2 times daily (before meals)  Qty: 30 tablet, Refills: 3  Start date: 10/21/2024             If you start feeling unwell or experience any of the following symptoms (including but not limited to), please call your primary care physician or return to the emergency room if you cannot get hold of your doctor:  Fever, chills, nausea, vomiting, diarrhea, change in mentation, falling, bleeding, shortness of breath.

## 2024-10-19 LAB
C COLI+JEJUNI TUF STL QL NAA+PROBE: NEGATIVE
EC STX1+STX2 GENES STL QL NAA+PROBE: NEGATIVE
ETEC ELTA+ESTB GENES STL QL NAA+PROBE: NEGATIVE
P SHIGELLOIDES DNA STL QL NAA+PROBE: NEGATIVE
SALMONELLA SP SPAO STL QL NAA+PROBE: NEGATIVE
SHIGELLA SP+EIEC IPAH STL QL NAA+PROBE: NEGATIVE
V CHOL+PARA+VUL DNA STL QL NAA+NON-PROBE: NEGATIVE
Y ENTEROCOL DNA STL QL NAA+NON-PROBE: NEGATIVE

## 2024-10-19 PROCEDURE — 2580000003 HC RX 258

## 2024-10-19 PROCEDURE — 6370000000 HC RX 637 (ALT 250 FOR IP): Performed by: HOSPITALIST

## 2024-10-19 PROCEDURE — 6360000002 HC RX W HCPCS: Performed by: INTERNAL MEDICINE

## 2024-10-19 PROCEDURE — 1100000000 HC RM PRIVATE

## 2024-10-19 PROCEDURE — 2580000003 HC RX 258: Performed by: INTERNAL MEDICINE

## 2024-10-19 PROCEDURE — 6360000002 HC RX W HCPCS

## 2024-10-19 RX ADMIN — PANTOPRAZOLE SODIUM 40 MG: 40 INJECTION, POWDER, FOR SOLUTION INTRAVENOUS at 09:32

## 2024-10-19 RX ADMIN — PANTOPRAZOLE SODIUM 40 MG: 40 INJECTION, POWDER, FOR SOLUTION INTRAVENOUS at 21:14

## 2024-10-19 RX ADMIN — SODIUM CHLORIDE, PRESERVATIVE FREE 10 ML: 5 INJECTION INTRAVENOUS at 09:33

## 2024-10-19 RX ADMIN — CEFUROXIME AXETIL 250 MG: 250 TABLET ORAL at 09:33

## 2024-10-19 RX ADMIN — HEPARIN SODIUM 5000 UNITS: 5000 INJECTION INTRAVENOUS; SUBCUTANEOUS at 09:32

## 2024-10-19 RX ADMIN — ONDANSETRON 4 MG: 2 INJECTION INTRAMUSCULAR; INTRAVENOUS at 09:33

## 2024-10-19 RX ADMIN — CEFUROXIME AXETIL 250 MG: 250 TABLET ORAL at 21:22

## 2024-10-19 RX ADMIN — ONDANSETRON 4 MG: 2 INJECTION INTRAMUSCULAR; INTRAVENOUS at 21:15

## 2024-10-19 RX ADMIN — SODIUM CHLORIDE, PRESERVATIVE FREE 10 ML: 5 INJECTION INTRAVENOUS at 21:15

## 2024-10-19 ASSESSMENT — PAIN SCALES - GENERAL
PAINLEVEL_OUTOF10: 0
PAINLEVEL_OUTOF10: 0

## 2024-10-20 LAB
GLUCOSE BLD STRIP.AUTO-MCNC: 89 MG/DL (ref 65–117)
SERVICE CMNT-IMP: NORMAL

## 2024-10-20 PROCEDURE — 6370000000 HC RX 637 (ALT 250 FOR IP): Performed by: HOSPITALIST

## 2024-10-20 PROCEDURE — 82962 GLUCOSE BLOOD TEST: CPT

## 2024-10-20 PROCEDURE — 2580000003 HC RX 258: Performed by: INTERNAL MEDICINE

## 2024-10-20 PROCEDURE — 1100000000 HC RM PRIVATE

## 2024-10-20 PROCEDURE — 6360000002 HC RX W HCPCS: Performed by: INTERNAL MEDICINE

## 2024-10-20 PROCEDURE — 2580000003 HC RX 258

## 2024-10-20 PROCEDURE — 94761 N-INVAS EAR/PLS OXIMETRY MLT: CPT

## 2024-10-20 PROCEDURE — 6370000000 HC RX 637 (ALT 250 FOR IP): Performed by: INTERNAL MEDICINE

## 2024-10-20 PROCEDURE — 6360000002 HC RX W HCPCS

## 2024-10-20 RX ORDER — TRAZODONE HYDROCHLORIDE 50 MG/1
50 TABLET, FILM COATED ORAL NIGHTLY PRN
Status: DISCONTINUED | OUTPATIENT
Start: 2024-10-20 | End: 2024-10-21 | Stop reason: HOSPADM

## 2024-10-20 RX ORDER — PANTOPRAZOLE SODIUM 40 MG/1
40 TABLET, DELAYED RELEASE ORAL
Status: DISCONTINUED | OUTPATIENT
Start: 2024-10-20 | End: 2024-10-21 | Stop reason: HOSPADM

## 2024-10-20 RX ADMIN — HEPARIN SODIUM 5000 UNITS: 5000 INJECTION INTRAVENOUS; SUBCUTANEOUS at 09:23

## 2024-10-20 RX ADMIN — PANTOPRAZOLE SODIUM 40 MG: 40 TABLET, DELAYED RELEASE ORAL at 15:24

## 2024-10-20 RX ADMIN — CEFUROXIME AXETIL 250 MG: 250 TABLET ORAL at 21:59

## 2024-10-20 RX ADMIN — ONDANSETRON 4 MG: 4 TABLET, ORALLY DISINTEGRATING ORAL at 11:41

## 2024-10-20 RX ADMIN — ACETAMINOPHEN 650 MG: 325 TABLET ORAL at 23:30

## 2024-10-20 RX ADMIN — SODIUM CHLORIDE, PRESERVATIVE FREE 10 ML: 5 INJECTION INTRAVENOUS at 09:23

## 2024-10-20 RX ADMIN — CEFUROXIME AXETIL 250 MG: 250 TABLET ORAL at 09:23

## 2024-10-20 ASSESSMENT — PAIN DESCRIPTION - LOCATION
LOCATION: GENERALIZED
LOCATION: BACK;ABDOMEN

## 2024-10-20 ASSESSMENT — PAIN SCALES - GENERAL
PAINLEVEL_OUTOF10: 0
PAINLEVEL_OUTOF10: 7
PAINLEVEL_OUTOF10: 0
PAINLEVEL_OUTOF10: 7

## 2024-10-20 ASSESSMENT — PAIN DESCRIPTION - DESCRIPTORS
DESCRIPTORS: ACHING
DESCRIPTORS: ACHING

## 2024-10-20 ASSESSMENT — PAIN DESCRIPTION - ORIENTATION: ORIENTATION: ANTERIOR

## 2024-10-21 VITALS
HEIGHT: 60 IN | OXYGEN SATURATION: 95 % | TEMPERATURE: 98.1 F | WEIGHT: 79 LBS | SYSTOLIC BLOOD PRESSURE: 112 MMHG | HEART RATE: 86 BPM | RESPIRATION RATE: 12 BRPM | BODY MASS INDEX: 15.51 KG/M2 | DIASTOLIC BLOOD PRESSURE: 67 MMHG

## 2024-10-21 LAB
BACTERIA SPEC CULT: NORMAL
CMV DNA SPEC QL NAA+PROBE: NEGATIVE
SPECIMEN SOURCE: NORMAL

## 2024-10-21 PROCEDURE — 6370000000 HC RX 637 (ALT 250 FOR IP): Performed by: HOSPITALIST

## 2024-10-21 PROCEDURE — 6360000002 HC RX W HCPCS: Performed by: INTERNAL MEDICINE

## 2024-10-21 RX ORDER — PANTOPRAZOLE SODIUM 40 MG/1
40 TABLET, DELAYED RELEASE ORAL
Qty: 30 TABLET | Refills: 3 | Status: SHIPPED | OUTPATIENT
Start: 2024-10-21

## 2024-10-21 RX ADMIN — HEPARIN SODIUM 5000 UNITS: 5000 INJECTION INTRAVENOUS; SUBCUTANEOUS at 09:15

## 2024-10-21 RX ADMIN — CEFUROXIME AXETIL 250 MG: 250 TABLET ORAL at 09:15

## 2024-10-21 RX ADMIN — PANTOPRAZOLE SODIUM 40 MG: 40 TABLET, DELAYED RELEASE ORAL at 16:34

## 2024-10-21 RX ADMIN — PANTOPRAZOLE SODIUM 40 MG: 40 TABLET, DELAYED RELEASE ORAL at 05:53

## 2024-10-21 ASSESSMENT — PAIN DESCRIPTION - LOCATION
LOCATION: BACK;ABDOMEN

## 2024-10-21 ASSESSMENT — PAIN SCALES - GENERAL
PAINLEVEL_OUTOF10: 7
PAINLEVEL_OUTOF10: 5
PAINLEVEL_OUTOF10: 7
PAINLEVEL_OUTOF10: 8
PAINLEVEL_OUTOF10: 5
PAINLEVEL_OUTOF10: 7

## 2024-10-21 ASSESSMENT — PAIN DESCRIPTION - ORIENTATION
ORIENTATION: ANTERIOR

## 2024-10-21 ASSESSMENT — PAIN DESCRIPTION - DESCRIPTORS
DESCRIPTORS: ACHING

## 2024-10-21 NOTE — DISCHARGE SUMMARY
Physician Discharge Summary     Patient ID:  Fouzia Castillo  [id number]  59 y.o.  1965    Admit date: 10/14/2024    Discharge date and time: 10/21/2024    HPI: Ms. Castillo is a 59 y.o.   female who is admitted with dehydration.  Ms. Castillo's past medical history of cervical cancer s/p colostomy and urostomy in July 31, 2024 presented to ER complaining of abdominal pain, poor p.o. intake, vomiting and weight loss for 1 month.  Patient has not been eating or drinking much for the last 1 months.  Has been vomiting associated with generalized abdominal pain.  Patient is very weak has difficulty walking.    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     Abdominal pain   Nausea and vomiting  Diarrhea, POA  Metabolic acidosis, likely from GI loss  Hypomagnesemia.  Initial CT scan abdomen and pelvis with initial concerns for thickened gallbladder and potential early cholecystitis; evaluated by general surgery with no surgical intervention recommended.  Otherwise, etiology of abdominal pain, nausea and vomiting with diarrhea unclear.  Stool studies including C. difficile noted to be negative.  Enteric panel negative.  Respiratory viral panel negative.  Overall improved, with improved abdominal pain and more formed stools.  Electrolytes appropriately repleted.  PPI BID on discharge.  Gastroenterology appreciated.     Acute kidney injury, POA  Prerenal and resolved with IV fluid hydration.  Continue to encourage

## 2024-10-21 NOTE — PLAN OF CARE
Problem: Occupational Therapy - Adult  Goal: By Discharge: Performs self-care activities at highest level of function for planned discharge setting.  See evaluation for individualized goals.  Description: FUNCTIONAL STATUS PRIOR TO ADMISSION: Patient uses RW for functional mobility, requires assistance for bathing, dressing, toileting. Patient with history of cervical cancer.    HOME SUPPORT: Patient lived with daughter and [employer] to provide assistance, both work during the day.    Occupational Therapy Goals:  Initiated 10/15/2024  1.  Patient will perform grooming with Contact Guard Assist within 7 day(s).  2.  Patient will perform upper body dressing with Contact Guard Assist within 7 day(s).  3.  Patient will perform lower body dressing with Moderate Assist within 7 day(s).  4.  Patient will perform toilet transfers with Contact Guard Assist  within 7 day(s).  5.  Patient will perform all aspects of toileting with Moderate Assist within 7 day(s).  6.  Patient will participate in upper extremity therapeutic exercise/activities with Contact Guard Assist for 5 minutes within 7 day(s).    7.  Patient will utilize energy conservation techniques during functional activities with verbal cues within 7 day(s).    Outcome: Progressing     OCCUPATIONAL THERAPY EVALUATION    Patient: Fouzia Castillo (59 y.o. female)  Date: 10/15/2024  Primary Diagnosis: Failure to thrive in adult [R62.7]  JE (acute kidney injury) ([facility]) [N17.9]         Precautions:                    ASSESSMENT :  The patient is limited by decreased functional mobility, independence in ADLs, high-level IADLs, strength, activity tolerance, endurance, balance, increased pain levels s/p admission for FTT following 1 month of abdominal pain, poor p.o intake, vomiting, weight loss, weakness. Patient with history of cervical cancer s/p colostomy and urostomy.     Based on the impairments listed above patient requires setup-max A for ADLs and mod A for mobility. 
  Problem: Occupational Therapy - Adult  Goal: By Discharge: Performs self-care activities at highest level of function for planned discharge setting.  See evaluation for individualized goals.  Description: FUNCTIONAL STATUS PRIOR TO ADMISSION: Patient uses RW for functional mobility, requires assistance for bathing, dressing, toileting. Patient with history of cervical cancer.    HOME SUPPORT: Patient lived with daughter and [employer] to provide assistance, both work during the day.    Occupational Therapy Goals:  Initiated 10/15/2024  1.  Patient will perform grooming with Contact Guard Assist within 7 day(s).  2.  Patient will perform upper body dressing with Contact Guard Assist within 7 day(s).  3.  Patient will perform lower body dressing with Moderate Assist within 7 day(s).  4.  Patient will perform toilet transfers with Contact Guard Assist  within 7 day(s).  5.  Patient will perform all aspects of toileting with Moderate Assist within 7 day(s).  6.  Patient will participate in upper extremity therapeutic exercise/activities with Contact Guard Assist for 5 minutes within 7 day(s).    7.  Patient will utilize energy conservation techniques during functional activities with verbal cues within 7 day(s).    Outcome: Progressing   OCCUPATIONAL THERAPY TREATMENT  Patient: Fouzia Castillo (59 y.o. female)  Date: 10/18/2024  Primary Diagnosis: Failure to thrive in adult [R62.7]  JE (acute kidney injury) (HCC) [N17.9]       Precautions: Skin                Chart, occupational therapy assessment, plan of care, and goals were reviewed.    ASSESSMENT  Patient continues to benefit from skilled OT services and is slowly progressing towards goals. Pt sat edge of bed, dep to don socks although she did not even try herself. Pt sat edge of bed to brush her teeth and than transfers to chair contact guard. Pt stated her goal is to \"get on her feet today\" however limits her own activities.              PLAN :  Patient continues to 
  Problem: Pain  Goal: Verbalizes/displays adequate comfort level or baseline comfort level  10/17/2024 2209 by Silva Godinez RN  Outcome: Progressing  10/17/2024 1952 by Katie Dumont RN  Outcome: Progressing     Problem: Safety - Adult  Goal: Free from fall injury  10/17/2024 2209 by Silva Godinez RN  Outcome: Progressing  10/17/2024 1952 by Katie Dumont RN  Outcome: Progressing     Problem: Skin/Tissue Integrity  Goal: Absence of new skin breakdown  Description: 1.  Monitor for areas of redness and/or skin breakdown  2.  Assess vascular access sites hourly  3.  Every 4-6 hours minimum:  Change oxygen saturation probe site  4.  Every 4-6 hours:  If on nasal continuous positive airway pressure, respiratory therapy assess nares and determine need for appliance change or resting period.  10/17/2024 2209 by Silva Godinez RN  Outcome: Progressing  10/17/2024 1952 by Katie Dumont RN  Outcome: Progressing     Problem: Nutrition Deficit:  Goal: Optimize nutritional status  10/17/2024 2209 by Silva Godinez RN  Outcome: Progressing  10/17/2024 1952 by Katie Dumont RN  Outcome: Progressing     Problem: Occupational Therapy - Adult  Goal: By Discharge: Performs self-care activities at highest level of function for planned discharge setting.  See evaluation for individualized goals.  Description: FUNCTIONAL STATUS PRIOR TO ADMISSION: Patient uses RW for functional mobility, requires assistance for bathing, dressing, toileting. Patient with history of cervical cancer.    HOME SUPPORT: Patient lived with daughter and [employer] to provide assistance, both work during the day.    Occupational Therapy Goals:  Initiated 10/15/2024  1.  Patient will perform grooming with Contact Guard Assist within 7 day(s).  2.  Patient will perform upper body dressing with Contact Guard Assist within 7 day(s).  3.  Patient will perform lower body dressing with Moderate Assist within 7 day(s).  4.  Patient will perform toilet 
  Problem: Pain  Goal: Verbalizes/displays adequate comfort level or baseline comfort level  Outcome: Progressing     Problem: Safety - Adult  Goal: Free from fall injury  Outcome: Progressing     Problem: Skin/Tissue Integrity  Goal: Absence of new skin breakdown  Description: 1.  Monitor for areas of redness and/or skin breakdown  2.  Assess vascular access sites hourly  3.  Every 4-6 hours minimum:  Change oxygen saturation probe site  4.  Every 4-6 hours:  If on nasal continuous positive airway pressure, respiratory therapy assess nares and determine need for appliance change or resting period.  Outcome: Progressing     
  Problem: Pain  Goal: Verbalizes/displays adequate comfort level or baseline comfort level  Outcome: Progressing     Problem: Safety - Adult  Goal: Free from fall injury  Outcome: Progressing     Problem: Skin/Tissue Integrity  Goal: Absence of new skin breakdown  Description: 1.  Monitor for areas of redness and/or skin breakdown  2.  Assess vascular access sites hourly  3.  Every 4-6 hours minimum:  Change oxygen saturation probe site  4.  Every 4-6 hours:  If on nasal continuous positive airway pressure, respiratory therapy assess nares and determine need for appliance change or resting period.  Outcome: Progressing     Problem: Nutrition Deficit:  Goal: Optimize nutritional status  Outcome: Progressing     
  Problem: Pain  Goal: Verbalizes/displays adequate comfort level or baseline comfort level  Outcome: Progressing     Problem: Safety - Adult  Goal: Free from fall injury  Outcome: Progressing     Problem: Skin/Tissue Integrity  Goal: Absence of new skin breakdown  Description: 1.  Monitor for areas of redness and/or skin breakdown  2.  Assess vascular access sites hourly  3.  Every 4-6 hours minimum:  Change oxygen saturation probe site  4.  Every 4-6 hours:  If on nasal continuous positive airway pressure, respiratory therapy assess nares and determine need for appliance change or resting period.  Outcome: Progressing     Problem: Nutrition Deficit:  Goal: Optimize nutritional status  Outcome: Progressing     
  Problem: Pain  Goal: Verbalizes/displays adequate comfort level or baseline comfort level  Outcome: Progressing     Problem: Safety - Adult  Goal: Free from fall injury  Outcome: Progressing     Problem: Skin/Tissue Integrity  Goal: Absence of new skin breakdown  Description: 1.  Monitor for areas of redness and/or skin breakdown  2.  Assess vascular access sites hourly  3.  Every 4-6 hours minimum:  Change oxygen saturation probe site  4.  Every 4-6 hours:  If on nasal continuous positive airway pressure, respiratory therapy assess nares and determine need for appliance change or resting period.  Outcome: Progressing     Problem: Nutrition Deficit:  Goal: Optimize nutritional status  Outcome: Progressing     Problem: Occupational Therapy - Adult  Goal: By Discharge: Performs self-care activities at highest level of function for planned discharge setting.  See evaluation for individualized goals.  Description: FUNCTIONAL STATUS PRIOR TO ADMISSION: Patient uses RW for functional mobility, requires assistance for bathing, dressing, toileting. Patient with history of cervical cancer.    HOME SUPPORT: Patient lived with daughter and [employer] to provide assistance, both work during the day.    Occupational Therapy Goals:  Initiated 10/15/2024  1.  Patient will perform grooming with Contact Guard Assist within 7 day(s).  2.  Patient will perform upper body dressing with Contact Guard Assist within 7 day(s).  3.  Patient will perform lower body dressing with Moderate Assist within 7 day(s).  4.  Patient will perform toilet transfers with Contact Guard Assist  within 7 day(s).  5.  Patient will perform all aspects of toileting with Moderate Assist within 7 day(s).  6.  Patient will participate in upper extremity therapeutic exercise/activities with Contact Guard Assist for 5 minutes within 7 day(s).    7.  Patient will utilize energy conservation techniques during functional activities with verbal cues within 7 
  Problem: Physical Therapy - Adult  Goal: By Discharge: Performs mobility at highest level of function for planned discharge setting.  See evaluation for individualized goals.  Description: FUNCTIONAL STATUS PRIOR TO ADMISSION: The patient  required moderate assistance for basic and instrumental ADLs.    HOME SUPPORT PRIOR TO ADMISSION: The patient lived with daughter and required maximum assistance for ADL's functional mobility.    Physical Therapy Goals  Initiated 10/15/2024  1.  Patient will move from supine to sit and sit to supine in bed with minimal assistance within 7 day(s).    2.  Patient will perform sit to stand with minimal assistance within 7 day(s).  3.  Patient will transfer from bed to chair and chair to bed with minimal assistance using the least restrictive device within 7 day(s).  4.  Patient will ambulate with moderate assistance for 15 feet with the least restrictive device within 7 day(s).       Outcome: Progressing     PHYSICAL THERAPY TREATMENT    Patient: Fouzia Castillo (59 y.o. female)  Date: 10/17/2024  Diagnosis: Failure to thrive in adult [R62.7]  JE (acute kidney injury) ([facility]) [N17.9] JE (acute kidney injury) ([facility])      Precautions: Skin                      ASSESSMENT:  Patient continues to benefit from skilled PT services and is slowly progressing towards goals. Patient continues to require minimal assist x 1-2 to mobilize out of bed with steps over to bedside chair. Patient benefiting from handheld assist x 1-2 for stabilization with ongoing generalized weakness and decreased balance. Standing tolerance limited. She continues to function below her baseline independent level and remains appropriate for skilled PT interventions.         PLAN:  Patient continues to benefit from skilled intervention to address the above impairments.  Continue treatment per established plan of care.    Recommend with staff: therapy recommendations for staff: Recommend mobility with staff assist x1 using 
  Problem: Physical Therapy - Adult  Goal: By Discharge: Performs mobility at highest level of function for planned discharge setting.  See evaluation for individualized goals.  Description: FUNCTIONAL STATUS PRIOR TO ADMISSION: The patient  required moderate assistance for basic and instrumental ADLs.    HOME SUPPORT PRIOR TO ADMISSION: The patient lived with daughter and required maximum assistance for ADL's functional mobility.    Physical Therapy Goals  Initiated 10/15/2024  1.  Patient will move from supine to sit and sit to supine in bed with minimal assistance within 7 day(s).    2.  Patient will perform sit to stand with minimal assistance within 7 day(s).  3.  Patient will transfer from bed to chair and chair to bed with minimal assistance using the least restrictive device within 7 day(s).  4.  Patient will ambulate with moderate assistance for 15 feet with the least restrictive device within 7 day(s).       Outcome: Progressing   PHYSICAL THERAPY EVALUATION    Patient: Fouzia Castillo (59 y.o. female)  Date: 10/15/2024  Primary Diagnosis: Failure to thrive in adult [R62.7]  JE (acute kidney injury) ([facility]) [N17.9]       Precautions: Restrictions/Precautions: Skin                      ASSESSMENT :   DEFICITS/IMPAIRMENTS:   The patient is limited by decreased functional mobility, independence in ADLs, high-level IADLs, ROM, strength, sensation, body mechanics, activity tolerance, endurance, safety awareness, coordination, balance, posture, increased pain levels     Based on the impairments listed above the patient with recent colostomy and weight loss with failure to thrive and h/o cervical cancer.  Today patient is mainly min/mod A x 1-2 for safety with transfers and gait.  Patient with colostomy and urostomy and therapist assisted with hygiene.  Patient is extremely limited by fatigue, weakness, and pain.  Patient with decreased LE strength with mild buckling noted at knee during standing.  Patient educated 
for 5 minutes within 7 day(s).    7.  Patient will utilize energy conservation techniques during functional activities with verbal cues within 7 day(s).    10/17/2024 1525 by Annia Ross OTA  Outcome: Progressing     Problem: Physical Therapy - Adult  Goal: By Discharge: Performs mobility at highest level of function for planned discharge setting.  See evaluation for individualized goals.  Description: FUNCTIONAL STATUS PRIOR TO ADMISSION: The patient  required moderate assistance for basic and instrumental ADLs.    HOME SUPPORT PRIOR TO ADMISSION: The patient lived with daughter and required maximum assistance for ADL's functional mobility.    Physical Therapy Goals  Initiated 10/15/2024  1.  Patient will move from supine to sit and sit to supine in bed with minimal assistance within 7 day(s).    2.  Patient will perform sit to stand with minimal assistance within 7 day(s).  3.  Patient will transfer from bed to chair and chair to bed with minimal assistance using the least restrictive device within 7 day(s).  4.  Patient will ambulate with moderate assistance for 15 feet with the least restrictive device within 7 day(s).       10/17/2024 1520 by Raisa Delgadillo, PT  Outcome: Progressing     
impairments.  Continue treatment per established plan of care to address goals.    Recommend with staff: ACTIVITIES OF DAILY LIVING's, there ex, there act    Recommend next OT session: cont towards goals    Recommendation for discharge: (in order for the patient to meet his/her long term goals):   Intermittent occupational therapy up to 2-3x/week in previous living setting    Other factors to consider for discharge: no additional factors    IF patient discharges home will need the following DME:        SUBJECTIVE:   Patient stated “thank you.”    OBJECTIVE DATA SUMMARY:   Cognitive/Behavioral Status:  Orientation  Overall Orientation Status: Within Normal Limits  Orientation Level: Oriented X4  Cognition  Overall Cognitive Status: WFL    Functional Mobility and Transfers for ADLs:  Bed Mobility:  Bed Mobility Training  Bed Mobility Training: Yes  Interventions: Safety awareness training;Verbal cues  Rolling: Minimum assistance  Supine to Sit: Minimum assistance  Scooting: Minimum assistance     Transfers:   Transfer Training  Transfer Training: Yes  Overall Level of Assistance: Minimum assistance;Assist X2  Interventions: Safety awareness training;Verbal cues  Sit to Stand: Minimum assistance;Assist X2  Stand to Sit: Minimum assistance;Assist X1  Bed to Chair: Minimum assistance;Assist X2           Balance:     Balance  Sitting - Static: Good (unsupported)  Sitting - Dynamic: Fair (occasional);Good (unsupported)  Standing - Static: Constant support;Fair  Standing - Dynamic: Constant support;Fair      ADL Intervention:       UE Dressing: Minimal assistance              Activity Tolerance:   Fair   Please refer to the flowsheet for vital signs taken during this treatment.    After treatment:   Patient left in no apparent distress sitting up in chair and Call bell within reach    COMMUNICATION/EDUCATION:   The patient's plan of care was discussed with: physical therapist, occupational therapist, and registered

## 2024-10-21 NOTE — PROGRESS NOTES
Missy Bravo PA-C                       [phone] office             Monday-Friday 8:00 am-4:30 pm  I am not permitted to use \"perfect serve\" use above for contact, thanks.        Gastroenterology Progress Note    October 18, 2024  Admit Date: 10/14/2024         Narrative Assessment and Plan   59 y.o. female being followed by GI for diarrhea.  She has clinically improved today, with less abdominal pain and more formed bowel movements.  C. difficile and viral respiratory panel negative, enteric bacterial panel still pending, although it was collected 10/16/2024.     Impression:  Diarrhea  Colostomy status    Plan:  Patient advanced to low fiber diet.  Patient would benefit from outpatient follow-up with [facility] as well as a registered dietitian for monitoring of nutrition and weight.  I have contacted our office to arrange for outpatient follow-up.  Inpatient gastroenterology to sign off, please reconsult as needed further.    Missy Bravo PA-C    Subjective:   Chief Complaint: Follow-up diarrhea    HPI: Patient laying in bed, appears lethargic but more comfortable than yesterday.  She tells me her abdominal pain has improved, and that her stool is becoming more firm.  Upon inspection, she has soft but formed stool in ostomy bag.  She feels ready to try solid foods again.    We explored her past medical history of bit more.  She tells me she was diagnosed with cervical cancer in 2008, is undergoing treatment for it, and has been in remission for the past 14 years.  She is trying to moved to the area and does not have a GI physician in [place] yet.  Tells me her prior provider did refer her to someone in [place] but she has not been able to establish appointments yet.    ROS:  The previous review of systems on initial consultation / H&P is noted and reviewed.  Specific changes noted above in HPI.    Current 
                                                                                                 Missy Bravo PA-C                       [phone] office             Monday-Friday 8:00 am-4:30 pm  I am not permitted to use \"perfect serve\" use above for contact, thanks.        Gastroenterology Progress Note    October 17, 2024  Admit Date: 10/14/2024         Narrative Assessment and Plan   59 y.o. female with colostomy and urostomy secondary to cervical cancer, being followed by GI for abdominal pain and diarrhea.  Abdominal pain and diarrhea persist today.  Respiratory viral panel negative C. difficile, enteric, and stool culture in process.    Impression:  Abdominal pain and diarrhea    Plan:  Continue with Levaquin for the time being, GI following for results of stool studies.  Continue pantoprazole 40 mg twice daily.  Reglan changed to Zofran scheduled twice daily, to assist in decreasing abdominal spasms and motility.    Missy Bravo PA-C    Subjective:   Chief Complaint: Follow-up abdominal pain and diarrhea    HPI: Patient lying in bed this morning, describes persistent abdominal pain.  She becomes frustrated with light palpation of the abdomen and expresses that it makes her pain worse.  Pain is localized to center of the abdomen.  Continues with liquid ostomy output.    ROS:  The previous review of systems on initial consultation / H&P is noted and reviewed.  Specific changes noted above in HPI.    Current Medications:     Current Facility-Administered Medications   Medication Dose Route Frequency    ondansetron (ZOFRAN) injection 4 mg  4 mg IntraVENous 2 times per day    pantoprazole (PROTONIX) 40 mg in sodium chloride (PF) 0.9 % 10 mL injection  40 mg IntraVENous BID    mirtazapine (REMERON) tablet 7.5 mg  7.5 mg Oral Nightly    melatonin tablet 3 mg  3 mg Oral Nightly PRN    sodium chloride flush 0.9 % injection 5-40 mL  5-40 mL IntraVENous 2 times per day    sodium chloride flush 0.9 % 
      [facility]                                                                               Hospitalist Progress Note  DALIA COLON MD          Date of Service:  10/16/2024  NAME:  Fouzia Castillo  :  1965  MRN:  [id number]    Please note that this dictation was completed with FitWith[employer], the computer voice recognition software.  Quite often unanticipated grammatical, syntax, homophones, and other interpretive errors are inadvertently transcribed by the computer software.  Please disregard these errors.  Please excuse any errors that have escaped final proofreading.    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     Interval history / Subjective:   In tears this AM due to abdominal pain. Abdomen is very tender and there is green watery stool in ostomy bag     Assessment & Plan:     Anticipated discharge date : 10/18  Anticipated disposition : SNF  Barriers to discharge : Medical stability      JE (acute kidney injury) (HCC)/dehydration  This is most likely secondary to vomiting and poor p.o. intake.  Resolved with hydraition      Metabolic Acidosis.  Likely secondary to above.  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    -Continue Levaquin (patient is allergic to penicillin with hives)  - urine cx 
      [facility]iff [facility]                                                                               Hospitalist Progress Note  DALIA COLON MD          Date of Service:  10/17/2024  NAME:  Fouzia Castillo  :  1965  MRN:  [id number]    Please note that this dictation was completed with [employer], the computer voice recognition software.  Quite often unanticipated grammatical, syntax, homophones, and other interpretive errors are inadvertently transcribed by the computer software.  Please disregard these errors.  Please excuse any errors that have escaped final proofreading.    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        Interval history / Subjective:   Feels better today, slept well with remeron. Still has diarrhea. Encouraged patient to work with PT/OT.      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    -Continue Levaquin (patient is allergic to penicillin with hives), for 2 more doses, E-Coli resistant to fluoroquinolones so will add Ceftriaxone x 5 days. Can switch to oral at the time of discharge   - urine 
0815: Patient refused blood work. MD made aware.     1034: Blood work attempted again. Patient refused. MD made aware.   
2200  Unable to complete whole admission question, patient wants to take rest she is tired.    0424  Patient with order for blood test but refused at the moment.      
Attempted to draw AM labs, the patient refused and stated, \"they will get them in the morning\". This patient was added to the phlebotomy list.  
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     Abdominal pain   Nausea and vomiting  Diarrhea, POA  Metabolic acidosis, likely from GI loss  Hypomagnesemia.  Initial CT scan abdomen and pelvis with initial concerns for thickened gallbladder and potential early cholecystitis; evaluated by general surgery with no surgical intervention recommended.  Otherwise, etiology of abdominal pain, nausea and vomiting with diarrhea unclear.  Stool studies including C. difficile noted to be negative.  Enteric panel pending.  Respiratory viral panel negative.  Overall improved, with improved abdominal pain and more formed stools.  Continue supportive therapy.  Continue to replete electrolytes as tolerated.  GI recommendations for diet noted; patient currently tolerating regular low fiber diet.  Gastroenterology appreciated.    Acute kidney injury, POA  Prerenal and resolved with IV fluid hydration.  Continue to encourage oral intake of fluids.     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  Outpatient non-urgent ERCP.     UTI (urinary tract infection)  Urine cultures growing E. coli and Enterococcus faecalis.  Has completed levofloxacin.  Switch from Rocephin to cefuroxime yesterday given IV access 
HUSSEIN PRESTON 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     Abdominal pain   Nausea and vomiting  Diarrhea, POA  Metabolic acidosis, likely from GI loss  Hypomagnesemia.  Initial CT scan abdomen and pelvis with initial concerns for thickened gallbladder and potential early cholecystitis; evaluated by general surgery with no surgical intervention recommended.  Otherwise, etiology of abdominal pain, nausea and vomiting with diarrhea unclear.  Stool studies including C. difficile noted to be negative.  Enteric panel pending.  Respiratory viral panel negative.  Overall improved, with improved abdominal pain and more formed stools.  Continue supportive therapy.  Continue to replete electrolytes as tolerated.  Repletion of magnesium today.  Gastroenterology appreciated.    Acute kidney injury, POA  Prerenal and resolved with IV fluid hydration.     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  Outpatient non-urgent ERCP.     UTI (urinary tract infection)  Urine cultures growing E. coli and Enterococcus faecalis.  Currently maintained on fluid quinolone and Rocephin; complete Rocephin times a total of 5 days.     Leukocytosis  Secondary to above reactive from GI versus UTI.  Resolved at this time.  Further 
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     Abdominal pain   Nausea and vomiting  Diarrhea, POA  Metabolic acidosis, likely from GI loss  Hypomagnesemia.  Initial CT scan abdomen and pelvis with initial concerns for thickened gallbladder and potential early cholecystitis; evaluated by general surgery with no surgical intervention recommended.  Otherwise, etiology of abdominal pain, nausea and vomiting with diarrhea unclear.  Stool studies including C. difficile noted to be negative.  Enteric panel negative.  Respiratory viral panel negative.  Overall improved, with improved abdominal pain and more formed stools.  Continue supportive therapy; today, transition Zofran to oral; transition PPI to oral.  Continue to replete electrolytes as tolerated.  GI recommendations for diet noted; patient currently tolerating regular low fiber diet.  Gastroenterology appreciated.    Acute kidney injury, POA  Prerenal and resolved with IV fluid hydration.  Continue to encourage oral intake of fluids.     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  Outpatient non-urgent ERCP.     UTI (urinary tract infection)  Urine cultures growing E. coli and Enterococcus faecalis.  Has completed levofloxacin for 
Nurse handed patient a copy of discharge instructions which have been read and explained to ny. New medications read and explained. Patient verbalized understanding. Patient aware that precriptions have been eletronically sent to pharmacy. Opportunity for questions and clarification offered.Removed patients IV access with no complications,VS stable, and patient sent with all belongings.   
Occupational Therapy  10/16/2024    Chart reviewed and up to date. Attempted OT session but patient adamantly declining despite extensive education and encouragement. Will defer per patient request and continue to follow.    Thank you,  Linda Holland, ESTHER, OTR/L    
Occupational/Physical therapy Note: Pt refusing PT/OT stated did not \"sleep all night.\" Will cont to follow.  
PHYSICAL THERAPY:Pt declined PT earlier today.PT unable to return.PT will continue to follow.  
PHYSICAL THERAPY:Pt declined mobilizing out of bed at this time reporting needing to get more sleep.PT will continue to follow.  
Patient refusing IV access to be placed. Refusing labs as well.     Patient turned q2h per protocol and patient request  
Pharmacy Dosing Note    Ordered medication: Enoxaparin 40 mg every 24 hours    New medication: Due to weight under 50 kg and CrCl under 30 mL/min, change to heparin 5000 every 12 hours per [facility] protocol.    Estimated Creatinine Clearance: 24 mL/min (A) (based on SCr of 1.41 mg/dL (H)).    Thank you,  Joe Galeana [facility]    
Pharmacy Dosing Note    Ordered medication: Levofloxacin 500 mg IV every 24 hours    New medication: Change to levofloxacin 500 mg IV x 1 dose followed by levofloxacin 250 mg IV every 24 hours per [facility] protocol for CrCl between 20 and 49 mL/min.. 5 day total duration stays the same    Estimated Creatinine Clearance: 24 mL/min (A) (based on SCr of 1.41 mg/dL (H)).    Thank you,  Joe Galeana RP    
Pharmacy Dosing Note    Ordered medication: Metoclopramide 10 mg IV every 6 hours    New medication: Change to metoclopramide 5 mg IV every 6 hours per [facility] policy for renal dose adjustment CrCl between 10 and 60 mL/min.    Estimated Creatinine Clearance: 28 mL/min (A) (based on SCr of 1.2 mg/dL (H)).    Thank you,  Joe Galeana [facility]    
Pt requesting am lab to be done during day shift by the phleb.  
Report given to Edmond Alcantara. Opportunity for any questions or concerns to be answered at time of call. Edmond with [facility] verbalized understanding.  
SURGERY PROGRESS NOTE      Admit Date: 10/14/2024    POD * No surgery found *    Procedure: * No surgery found *      Subjective:   Patient seen and examined. Doing better today. Tolerated diet. Some nausea overnight, which she attributes to pain medication. No nausea, vomiting, or abdominal pain with PO intake.       Objective:     /71   Pulse 72   Temp 98.2 °F (36.8 °C) (Oral)   Resp 18   Ht 1.524 m (5')   Wt 35.8 kg (79 lb)   SpO2 100%   BMI 15.43 kg/m²      Temp (24hrs), Av.4 °F (36.9 °C), Min:97.7 °F (36.5 °C), Max:98.8 °F (37.1 °C)      Physical Exam:     Abdomen:  Soft.  Non-tender, non-distended. Ostomies productive.     Assessment:     Principal Problem:    JE (acute kidney injury) (HCC)  Active Problems:    Leukocytosis    Acidosis    UTI (urinary tract infection)    Adult failure to thrive    Cervical cancer (HCC)    History of urostomy    Nausea and vomiting    Abdominal pain    Weight loss    Debility    Dehydration    Severe protein-calorie malnutrition (HCC)  Resolved Problems:    * No resolved hospital problems. *      Plan/Recommendations/Medical Decision Making:   Doing better today  Asymptomatic and non tender  No indication for surgical intervention   Follow up as needed   Call if further questions arise       Talya Woods PA-C  
This RN attempted to administer nightly medications. Patient advised she wanted a new nurse and refused to explain. Nursing supervisor called, report given to Darlin PUTNAM.  
Ultrasound IV by [employer] [facility] :  Procedure Note    Ultrasound IV education provided to patient. Opportunities for questions given.     Ultrasound used for PIV placement:  20 gauge 5.7 cm AccuCath Ace 5.7cm  right brachial} location.  1 X Attempt(s).    Vigorous blood return present and saline flushes with ease.     Procedure tolerated well. Primary RN aware of IV placement and added to LDA.      Kira Cleaning RN  
        Patients current active Medications were reviewed, considered, added and adjusted based on the clinical condition today.      Home Medications were reconciled to the best of my ability given all available resources at the time of admission. Route is PO if not otherwise noted.      Admission Status:[id number]:::1}      Medications Reviewed:     Current Facility-Administered Medications   Medication Dose Route Frequency    metoclopramide (REGLAN) injection 5 mg  5 mg IntraVENous Q6H    sodium chloride flush 0.9 % injection 5-40 mL  5-40 mL IntraVENous 2 times per day    sodium chloride flush 0.9 % injection 5-40 mL  5-40 mL IntraVENous PRN    0.9 % sodium chloride infusion   IntraVENous PRN    potassium chloride (KLOR-CON) extended release tablet 40 mEq  40 mEq Oral PRN    Or    potassium bicarb-citric acid (EFFER-K) effervescent tablet 40 mEq  40 mEq Oral PRN    Or    potassium chloride 10 mEq/100 mL IVPB (Peripheral Line)  10 mEq IntraVENous PRN    magnesium sulfate 2000 mg in 50 mL IVPB premix  2,000 mg IntraVENous PRN    ondansetron (ZOFRAN-ODT) disintegrating tablet 4 mg  4 mg Oral Q8H PRN    Or    ondansetron (ZOFRAN) injection 4 mg  4 mg IntraVENous Q6H PRN    polyethylene glycol (GLYCOLAX) packet 17 g  17 g Oral Daily PRN    acetaminophen (TYLENOL) tablet 650 mg  650 mg Oral Q6H PRN    Or    acetaminophen (TYLENOL) suppository 650 mg  650 mg Rectal Q6H PRN    0.9 % sodium chloride infusion   IntraVENous Continuous    heparin (porcine) injection 5,000 Units  5,000 Units SubCUTAneous BID    levoFLOXacin (LEVAQUIN) 250 MG/50ML infusion 250 mg  250 mg IntraVENous Q24H     ______________________________________________________________________  EXPECTED LENGTH OF STAY: 2  ACTUAL LENGTH OF STAY:          1                 DALIA COLON MD

## 2025-06-14 ENCOUNTER — HOSPITAL ENCOUNTER (INPATIENT)
Facility: HOSPITAL | Age: 60
DRG: 390 | End: 2025-06-14
Attending: EMERGENCY MEDICINE | Admitting: STUDENT IN AN ORGANIZED HEALTH CARE EDUCATION/TRAINING PROGRAM
Payer: COMMERCIAL

## 2025-06-14 DIAGNOSIS — R94.31 ST ELEVATION: ICD-10-CM

## 2025-06-14 DIAGNOSIS — K56.609 SBO (SMALL BOWEL OBSTRUCTION) (CMS/HCC): Primary | ICD-10-CM

## 2025-06-14 LAB
BASOPHILS # BLD: 0.01 K/UL (ref 0.01–0.1)
BASOPHILS NFR BLD: 0.1 %
DIFFERENTIAL METHOD BLD: ABNORMAL
EOSINOPHIL # BLD: 0.03 K/UL (ref 0.04–0.36)
EOSINOPHIL NFR BLD: 0.3 %
ERYTHROCYTE [DISTWIDTH] IN BLOOD BY AUTOMATED COUNT: 14.6 % (ref 11.7–14.4)
HCT VFR BLD AUTO: 33.5 % (ref 35–45)
HGB BLD-MCNC: 10.5 G/DL (ref 11.8–15.7)
IMM GRANULOCYTES # BLD AUTO: 0.02 K/UL (ref 0–0.08)
IMM GRANULOCYTES NFR BLD AUTO: 0.2 %
LACTATE SERPL-SCNC: 0.9 MMOL/L (ref 0.4–2)
LYMPHOCYTES # BLD: 1.15 K/UL (ref 1.2–3.5)
LYMPHOCYTES NFR BLD: 10.5 %
MCH RBC QN AUTO: 26.4 PG (ref 28–33.2)
MCHC RBC AUTO-ENTMCNC: 31.3 G/DL (ref 32.2–35.5)
MCV RBC AUTO: 84.4 FL (ref 83–98)
MONOCYTES # BLD: 0.52 K/UL (ref 0.28–0.8)
MONOCYTES NFR BLD: 4.7 %
NEUTROPHILS # BLD: 9.24 K/UL (ref 1.7–7)
NEUTS SEG NFR BLD: 84.2 %
NRBC BLD-RTO: 0 %
PLATELET # BLD AUTO: 269 K/UL (ref 150–369)
PMV BLD AUTO: 10 FL (ref 9.4–12.3)
RBC # BLD AUTO: 3.97 M/UL (ref 3.93–5.22)
WBC # BLD AUTO: 10.97 K/UL (ref 3.8–10.5)

## 2025-06-14 PROCEDURE — 36415 COLL VENOUS BLD VENIPUNCTURE: CPT | Performed by: EMERGENCY MEDICINE

## 2025-06-14 PROCEDURE — 96375 TX/PRO/DX INJ NEW DRUG ADDON: CPT

## 2025-06-14 PROCEDURE — 99285 EMERGENCY DEPT VISIT HI MDM: CPT | Mod: 25

## 2025-06-14 PROCEDURE — 25800000 HC PHARMACY IV SOLUTIONS: Performed by: EMERGENCY MEDICINE

## 2025-06-14 PROCEDURE — 93005 ELECTROCARDIOGRAM TRACING: CPT | Performed by: EMERGENCY MEDICINE

## 2025-06-14 PROCEDURE — 85025 COMPLETE CBC W/AUTO DIFF WBC: CPT | Performed by: EMERGENCY MEDICINE

## 2025-06-14 PROCEDURE — 63600000 HC DRUGS/DETAIL CODE: Mod: JZ | Performed by: EMERGENCY MEDICINE

## 2025-06-14 PROCEDURE — 83690 ASSAY OF LIPASE: CPT | Performed by: EMERGENCY MEDICINE

## 2025-06-14 PROCEDURE — 96374 THER/PROPH/DIAG INJ IV PUSH: CPT

## 2025-06-14 PROCEDURE — 99291 CRITICAL CARE FIRST HOUR: CPT

## 2025-06-14 PROCEDURE — 83735 ASSAY OF MAGNESIUM: CPT | Performed by: EMERGENCY MEDICINE

## 2025-06-14 PROCEDURE — 83605 ASSAY OF LACTIC ACID: CPT | Performed by: EMERGENCY MEDICINE

## 2025-06-14 PROCEDURE — 80053 COMPREHEN METABOLIC PANEL: CPT | Performed by: EMERGENCY MEDICINE

## 2025-06-14 RX ORDER — MORPHINE SULFATE 2 MG/ML
4 INJECTION, SOLUTION INTRAMUSCULAR; INTRAVENOUS ONCE
Status: COMPLETED | OUTPATIENT
Start: 2025-06-14 | End: 2025-06-14

## 2025-06-14 RX ORDER — ONDANSETRON HYDROCHLORIDE 2 MG/ML
4 INJECTION, SOLUTION INTRAVENOUS ONCE
Status: COMPLETED | OUTPATIENT
Start: 2025-06-14 | End: 2025-06-14

## 2025-06-14 RX ADMIN — SODIUM CHLORIDE 1000 ML: 9 INJECTION, SOLUTION INTRAVENOUS at 23:41

## 2025-06-14 RX ADMIN — MORPHINE SULFATE 4 MG: 2 INJECTION, SOLUTION INTRAMUSCULAR; INTRAVENOUS at 23:40

## 2025-06-14 RX ADMIN — ONDANSETRON 4 MG: 2 INJECTION INTRAMUSCULAR; INTRAVENOUS at 23:41

## 2025-06-14 ASSESSMENT — ENCOUNTER SYMPTOMS
FEVER: 0
HEADACHES: 0
ABDOMINAL PAIN: 1
DIARRHEA: 0
SHORTNESS OF BREATH: 0
RHINORRHEA: 0
COUGH: 0
VOMITING: 1

## 2025-06-15 ENCOUNTER — APPOINTMENT (EMERGENCY)
Dept: RADIOLOGY | Facility: HOSPITAL | Age: 60
DRG: 390 | End: 2025-06-15
Attending: EMERGENCY MEDICINE
Payer: COMMERCIAL

## 2025-06-15 ENCOUNTER — APPOINTMENT (INPATIENT)
Dept: RADIOLOGY | Facility: HOSPITAL | Age: 60
DRG: 390 | End: 2025-06-15
Payer: COMMERCIAL

## 2025-06-15 VITALS
OXYGEN SATURATION: 98 % | RESPIRATION RATE: 19 BRPM | HEIGHT: 60 IN | DIASTOLIC BLOOD PRESSURE: 81 MMHG | HEART RATE: 87 BPM | WEIGHT: 93.7 LBS | BODY MASS INDEX: 18.4 KG/M2 | SYSTOLIC BLOOD PRESSURE: 173 MMHG | TEMPERATURE: 98 F

## 2025-06-15 PROBLEM — K56.609 SBO (SMALL BOWEL OBSTRUCTION) (CMS/HCC): Status: ACTIVE | Noted: 2025-06-15

## 2025-06-15 LAB
ALBUMIN SERPL-MCNC: 3.7 G/DL (ref 3.5–5.7)
ALP SERPL-CCNC: 78 IU/L (ref 34–125)
ALT SERPL-CCNC: 19 IU/L (ref 7–52)
ANION GAP SERPL CALC-SCNC: 10 MEQ/L (ref 3–15)
ANION GAP SERPL CALC-SCNC: 10 MEQ/L (ref 3–15)
AST SERPL-CCNC: 18 IU/L (ref 13–39)
ATRIAL RATE: 300
ATRIAL RATE: 87
BACTERIA URNS QL MICRO: 1 /HPF
BILIRUB SERPL-MCNC: 0.6 MG/DL (ref 0.3–1.2)
BILIRUB UR QL STRIP.AUTO: NEGATIVE MG/DL
BUN SERPL-MCNC: 20 MG/DL (ref 7–25)
BUN SERPL-MCNC: 23 MG/DL (ref 7–25)
CALCIUM SERPL-MCNC: 9.1 MG/DL (ref 8.6–10.3)
CALCIUM SERPL-MCNC: 9.4 MG/DL (ref 8.6–10.3)
CHLORIDE SERPL-SCNC: 112 MEQ/L (ref 98–107)
CHLORIDE SERPL-SCNC: 115 MEQ/L (ref 98–107)
CLARITY UR REFRACT.AUTO: CLEAR
CO2 SERPL-SCNC: 18 MEQ/L (ref 21–31)
CO2 SERPL-SCNC: 20 MEQ/L (ref 21–31)
COLOR UR AUTO: YELLOW
CREAT SERPL-MCNC: 0.6 MG/DL (ref 0.6–1.2)
CREAT SERPL-MCNC: 0.7 MG/DL (ref 0.6–1.2)
EGFRCR SERPLBLD CKD-EPI 2021: >60 ML/MIN/1.73M*2
EGFRCR SERPLBLD CKD-EPI 2021: >60 ML/MIN/1.73M*2
ERYTHROCYTE [DISTWIDTH] IN BLOOD BY AUTOMATED COUNT: 14.8 % (ref 11.7–14.4)
GLUCOSE SERPL-MCNC: 105 MG/DL (ref 70–99)
GLUCOSE SERPL-MCNC: 98 MG/DL (ref 70–99)
GLUCOSE UR STRIP.AUTO-MCNC: NEGATIVE MG/DL
HCT VFR BLD AUTO: 33.3 % (ref 35–45)
HGB BLD-MCNC: 10.3 G/DL (ref 11.8–15.7)
HGB UR QL STRIP.AUTO: NEGATIVE
HYALINE CASTS #/AREA URNS LPF: ABNORMAL /LPF
KETONES UR STRIP.AUTO-MCNC: NEGATIVE MG/DL
LEUKOCYTE ESTERASE UR QL STRIP.AUTO: 1
LIPASE SERPL-CCNC: 22 U/L (ref 11–82)
MAGNESIUM SERPL-MCNC: 1.5 MG/DL (ref 1.8–2.5)
MAGNESIUM SERPL-MCNC: 1.6 MG/DL (ref 1.8–2.5)
MCH RBC QN AUTO: 26.9 PG (ref 28–33.2)
MCHC RBC AUTO-ENTMCNC: 30.9 G/DL (ref 32.2–35.5)
MCV RBC AUTO: 86.9 FL (ref 83–98)
NITRITE UR QL STRIP.AUTO: NEGATIVE
P AXIS: 18
P AXIS: 51
PH UR STRIP.AUTO: 8 [PH]
PHOSPHATE SERPL-MCNC: 4.1 MG/DL (ref 2.4–4.7)
PLATELET # BLD AUTO: 263 K/UL (ref 150–369)
PMV BLD AUTO: 11.1 FL (ref 9.4–12.3)
POTASSIUM SERPL-SCNC: 3.9 MEQ/L (ref 3.5–5.1)
POTASSIUM SERPL-SCNC: 3.9 MEQ/L (ref 3.5–5.1)
PR INTERVAL: 158
PROT SERPL-MCNC: 6.9 G/DL (ref 6–8.2)
PROT UR QL STRIP.AUTO: ABNORMAL
QRS DURATION: 70
QRS DURATION: 78
QT INTERVAL: 362
QT INTERVAL: 372
QTC CALCULATION(BAZETT): 427
QTC CALCULATION(BAZETT): 447
R AXIS: -28
R AXIS: 0
RBC # BLD AUTO: 3.83 M/UL (ref 3.93–5.22)
RBC #/AREA URNS HPF: ABNORMAL /HPF
RENAL EPI CELLS URNS QL MICRO: ABNORMAL /HPF
SODIUM SERPL-SCNC: 142 MEQ/L (ref 136–145)
SODIUM SERPL-SCNC: 143 MEQ/L (ref 136–145)
SP GR UR REFRACT.AUTO: 1.03
SQUAMOUS URNS QL MICRO: ABNORMAL /HPF
T WAVE AXIS: 72
T WAVE AXIS: 74
TRI-PHOS CRY URNS QL MICRO: 1 /HPF
UROBILINOGEN UR STRIP-ACNC: 0.2 EU/DL
VENTRICULAR RATE: 84
VENTRICULAR RATE: 87
WBC # BLD AUTO: 8.71 K/UL (ref 3.8–10.5)
WBC #/AREA URNS HPF: ABNORMAL /HPF

## 2025-06-15 PROCEDURE — 85027 COMPLETE CBC AUTOMATED: CPT

## 2025-06-15 PROCEDURE — 74018 RADEX ABDOMEN 1 VIEW: CPT

## 2025-06-15 PROCEDURE — 81001 URINALYSIS AUTO W/SCOPE: CPT | Performed by: EMERGENCY MEDICINE

## 2025-06-15 PROCEDURE — 0D9670Z DRAINAGE OF STOMACH WITH DRAINAGE DEVICE, VIA NATURAL OR ARTIFICIAL OPENING: ICD-10-PCS | Performed by: STUDENT IN AN ORGANIZED HEALTH CARE EDUCATION/TRAINING PROGRAM

## 2025-06-15 PROCEDURE — 80048 BASIC METABOLIC PNL TOTAL CA: CPT

## 2025-06-15 PROCEDURE — 99223 1ST HOSP IP/OBS HIGH 75: CPT | Performed by: STUDENT IN AN ORGANIZED HEALTH CARE EDUCATION/TRAINING PROGRAM

## 2025-06-15 PROCEDURE — 63600000 HC DRUGS/DETAIL CODE: Mod: JZ

## 2025-06-15 PROCEDURE — 96361 HYDRATE IV INFUSION ADD-ON: CPT

## 2025-06-15 PROCEDURE — 87077 CULTURE AEROBIC IDENTIFY: CPT | Performed by: EMERGENCY MEDICINE

## 2025-06-15 PROCEDURE — 21400000 HC ROOM AND CARE CCU/INTERMEDIATE

## 2025-06-15 PROCEDURE — 63600105 HC IODINE BASED CONTRAST: Mod: JW | Performed by: EMERGENCY MEDICINE

## 2025-06-15 PROCEDURE — 36415 COLL VENOUS BLD VENIPUNCTURE: CPT

## 2025-06-15 PROCEDURE — 83735 ASSAY OF MAGNESIUM: CPT

## 2025-06-15 PROCEDURE — 93005 ELECTROCARDIOGRAM TRACING: CPT

## 2025-06-15 PROCEDURE — 25800000 HC PHARMACY IV SOLUTIONS

## 2025-06-15 PROCEDURE — 93010 ELECTROCARDIOGRAM REPORT: CPT | Mod: 76 | Performed by: INTERNAL MEDICINE

## 2025-06-15 PROCEDURE — 63600000 HC DRUGS/DETAIL CODE: Mod: JZ | Performed by: EMERGENCY MEDICINE

## 2025-06-15 PROCEDURE — 93010 ELECTROCARDIOGRAM REPORT: CPT | Performed by: INTERNAL MEDICINE

## 2025-06-15 PROCEDURE — 74177 CT ABD & PELVIS W/CONTRAST: CPT

## 2025-06-15 PROCEDURE — 96376 TX/PRO/DX INJ SAME DRUG ADON: CPT

## 2025-06-15 PROCEDURE — 84100 ASSAY OF PHOSPHORUS: CPT

## 2025-06-15 PROCEDURE — 63700000 HC SELF-ADMINISTRABLE DRUG

## 2025-06-15 RX ORDER — KETOROLAC TROMETHAMINE 15 MG/ML
15 INJECTION, SOLUTION INTRAMUSCULAR; INTRAVENOUS EVERY 6 HOURS PRN
Status: DISCONTINUED | OUTPATIENT
Start: 2025-06-15 | End: 2025-06-17

## 2025-06-15 RX ORDER — DEXTROSE 40 %
15-30 GEL (GRAM) ORAL AS NEEDED
Status: DISCONTINUED | OUTPATIENT
Start: 2025-06-15 | End: 2025-06-18 | Stop reason: HOSPADM

## 2025-06-15 RX ORDER — IOPAMIDOL 755 MG/ML
100 INJECTION, SOLUTION INTRAVASCULAR
Status: COMPLETED | OUTPATIENT
Start: 2025-06-15 | End: 2025-06-15

## 2025-06-15 RX ORDER — ADHESIVE BANDAGE 7/8"
15-30 BANDAGE TOPICAL AS NEEDED
Status: DISCONTINUED | OUTPATIENT
Start: 2025-06-15 | End: 2025-06-18 | Stop reason: HOSPADM

## 2025-06-15 RX ORDER — ONDANSETRON HYDROCHLORIDE 2 MG/ML
4 INJECTION, SOLUTION INTRAVENOUS ONCE
Status: COMPLETED | OUTPATIENT
Start: 2025-06-15 | End: 2025-06-15

## 2025-06-15 RX ORDER — DEXTROSE MONOHYDRATE, SODIUM CHLORIDE, AND POTASSIUM CHLORIDE 50; 1.49; 4.5 G/1000ML; G/1000ML; G/1000ML
INJECTION, SOLUTION INTRAVENOUS CONTINUOUS
Status: ACTIVE | OUTPATIENT
Start: 2025-06-15 | End: 2025-06-16

## 2025-06-15 RX ORDER — HEPARIN SODIUM 5000 [USP'U]/ML
5000 INJECTION, SOLUTION INTRAVENOUS; SUBCUTANEOUS EVERY 8 HOURS
Status: DISCONTINUED | OUTPATIENT
Start: 2025-06-16 | End: 2025-06-18 | Stop reason: HOSPADM

## 2025-06-15 RX ORDER — DEXTROSE 50 % IN WATER (D50W) INTRAVENOUS SYRINGE
25 AS NEEDED
Status: DISCONTINUED | OUTPATIENT
Start: 2025-06-15 | End: 2025-06-18 | Stop reason: HOSPADM

## 2025-06-15 RX ORDER — MORPHINE SULFATE 2 MG/ML
4 INJECTION, SOLUTION INTRAMUSCULAR; INTRAVENOUS ONCE
Status: COMPLETED | OUTPATIENT
Start: 2025-06-15 | End: 2025-06-15

## 2025-06-15 RX ORDER — HYDROMORPHONE HYDROCHLORIDE 1 MG/ML
0.5 INJECTION, SOLUTION INTRAMUSCULAR; INTRAVENOUS; SUBCUTANEOUS EVERY 4 HOURS PRN
Status: DISCONTINUED | OUTPATIENT
Start: 2025-06-15 | End: 2025-06-17

## 2025-06-15 RX ADMIN — PHENOL 1 SPRAY: 1.5 LIQUID ORAL at 14:54

## 2025-06-15 RX ADMIN — ONDANSETRON 4 MG: 2 INJECTION INTRAMUSCULAR; INTRAVENOUS at 03:34

## 2025-06-15 RX ADMIN — HYDROMORPHONE HYDROCHLORIDE 0.5 MG: 1 INJECTION, SOLUTION INTRAMUSCULAR; INTRAVENOUS; SUBCUTANEOUS at 22:46

## 2025-06-15 RX ADMIN — MORPHINE SULFATE 4 MG: 2 INJECTION, SOLUTION INTRAMUSCULAR; INTRAVENOUS at 03:34

## 2025-06-15 RX ADMIN — MAGNESIUM SULFATE HEPTAHYDRATE 2 G: 40 INJECTION, SOLUTION INTRAVENOUS at 06:24

## 2025-06-15 RX ADMIN — KETOROLAC TROMETHAMINE 15 MG: 15 INJECTION, SOLUTION INTRAMUSCULAR; INTRAVENOUS at 10:36

## 2025-06-15 RX ADMIN — POTASSIUM CHLORIDE, DEXTROSE MONOHYDRATE AND SODIUM CHLORIDE: 150; 5; 450 INJECTION, SOLUTION INTRAVENOUS at 18:27

## 2025-06-15 RX ADMIN — HYDROMORPHONE HYDROCHLORIDE 0.5 MG: 1 INJECTION, SOLUTION INTRAMUSCULAR; INTRAVENOUS; SUBCUTANEOUS at 06:28

## 2025-06-15 RX ADMIN — IOPAMIDOL 80 ML: 755 INJECTION, SOLUTION INTRAVENOUS at 01:12

## 2025-06-15 RX ADMIN — HYDROMORPHONE HYDROCHLORIDE 0.5 MG: 1 INJECTION, SOLUTION INTRAMUSCULAR; INTRAVENOUS; SUBCUTANEOUS at 14:53

## 2025-06-15 RX ADMIN — HYDROMORPHONE HYDROCHLORIDE 0.5 MG: 1 INJECTION, SOLUTION INTRAMUSCULAR; INTRAVENOUS; SUBCUTANEOUS at 11:01

## 2025-06-15 RX ADMIN — POTASSIUM CHLORIDE, DEXTROSE MONOHYDRATE AND SODIUM CHLORIDE: 150; 5; 450 INJECTION, SOLUTION INTRAVENOUS at 06:24

## 2025-06-15 RX ADMIN — HYDROMORPHONE HYDROCHLORIDE 0.5 MG: 1 INJECTION, SOLUTION INTRAMUSCULAR; INTRAVENOUS; SUBCUTANEOUS at 18:49

## 2025-06-15 RX ADMIN — KETOROLAC TROMETHAMINE 15 MG: 15 INJECTION, SOLUTION INTRAMUSCULAR; INTRAVENOUS at 19:47

## 2025-06-15 RX ADMIN — MAGNESIUM SULFATE HEPTAHYDRATE 4 G: 40 INJECTION, SOLUTION INTRAVENOUS at 14:53

## 2025-06-15 ASSESSMENT — COGNITIVE AND FUNCTIONAL STATUS - GENERAL
CLIMB 3 TO 5 STEPS WITH RAILING: 3 - A LITTLE
WALKING IN HOSPITAL ROOM: 3 - A LITTLE
STANDING UP FROM CHAIR USING ARMS: 4 - NONE
MOVING TO AND FROM BED TO CHAIR: 4 - NONE
CLIMB 3 TO 5 STEPS WITH RAILING: 4 - NONE
CLIMB 3 TO 5 STEPS WITH RAILING: 3 - A LITTLE
WALKING IN HOSPITAL ROOM: 4 - NONE
STANDING UP FROM CHAIR USING ARMS: 4 - NONE
STANDING UP FROM CHAIR USING ARMS: 4 - NONE
MOVING TO AND FROM BED TO CHAIR: 4 - NONE
WALKING IN HOSPITAL ROOM: 3 - A LITTLE
MOVING TO AND FROM BED TO CHAIR: 4 - NONE

## 2025-06-15 NOTE — PROGRESS NOTES
"Maria Teresa L Day   [phone]    Your doctor has referred you for a   that requires the injection of an iodinated contrast material into your bloodstream. This iodinated contrast material, sometimes referred to as \"x-ray dye\" allows for better interpretation of the x-ray films or CT images and results in a more accurate interpretation of the examination.     Without the use of iodinated contrast (x-ray dye), the examination may be less informative and result in a suboptimal examination, and possibly a delay in diagnosis and, if needed, treatment.     The iodinated contrast material is given through a small needle or catheter placed into a vein, usually on the inside of the elbow, on the back of hand, or in a vein in the foot or lower leg.    The most common, though still rare, potential reaction to an intravenous contrast injection is an allergic-like reaction. Most allergic-like reactions are mild, though a small subset of people can have more severe reactions. Mild reactions include mild / scattered hives, itching, scratchy throat, sneezing and nasal congestion. More severe reactions include facial swelling, severe difficulty breathing, wheezing and anaphylactic shock. Those with a prior history allergic-like reaction to the same class of contrast media (such as CT contrast or MRI contrast) are at the highest risk for an allergic reaction.     If you believe you had an allergic reaction to contrast in the past, please let our staff know. We can determine if this increases your risk for a future reaction and provide steps to decrease the risk. This may delay your examination, but it decreases the risk of having a new and possibly more severe reaction to the contrast injection.    People with a history of prior allergic reactions to other substances (such as unrelated medications and food) and patients with a history of asthma have slightly increased risk for an allergic reaction from contrast material when " compared with the general population, but do not require to be pretreated prior to a contrast injection.    You should notify the physician, nurse or technologist if you have ever had any of these conditions or if you have any questions.

## 2025-06-15 NOTE — H&P
General Surgery History and Physical ([id number])    Chief Complaint:   Chief Complaint   Patient presents with    Vomiting    Nausea   .    HPI     Patient is a 60 y.o. female w/ PMHx of cervical CA s/p chemorads (15yrs remission) and PSHx of ex lap, GHAZALA, colostomy and ileal conduit creation (7/2024, cancer treatment centers), for active vaginal, vesicovaginal, urethrovaginal fistulas and no other significant past medical history presents to the ED with sudden onset abdominal pain, nausea/vomiting on 6/14.  She states she tolerated breakfast well and had her normal ostomy output prior to that.  She did not notice gas in her bag since the afternoon.  She had tea with lunch just prior to symptom onset.  She reports persistent, intermittent, cramping diffuse abdominal pain.  She denies similar symptoms in the past or history of obstruction.  She denies fevers, chills, change in urinary habits.    In the ED, vital stable, afebrile.  Workup shows WBC 10.9, Hgb 10.5, BUN 23, creatinine 0.7, lactate 0.9.  CT abdomen pelvis with evidence of mildly dilated stomach as well as dilated loops of small bowel.    Medical History: No past medical history on file.    Surgical History: No past surgical history on file.    Social History:   Social History     Socioeconomic History    Marital status: Single     Social Drivers of Health     Food Insecurity: No Food Insecurity (6/15/2025)    Hunger Vital Sign     Worried About Running Out of Food in the Last Year: Never true     Ran Out of Food in the Last Year: Never true       Family History: No family history on file.    Allergies: Penicillins    Home Medications:      Current Medications:  No current facility-administered medications for this encounter.     No current outpatient medications on file.       Review of Systems  Pertinent items are noted in HPI.    Objective     Vital Signs for the last 24 hours:  Temp:  [36.8 °C (98.3 °F)] 36.8 °C (98.3 °F)  Heart Rate:  [85] 85  Resp:   [17-18] 18  BP: (123-163)/(69-77) 163/77    Physicial Exam    General appearance: alert, appears stated age, and cooperative, cachectic  Head: normocephalic, without obvious abnormality, atraumatic  Eyes: conjunctivae/corneas clear. PERRL, EOM's intact.  Neck: supple, symmetrical, trachea midline  Lungs: Breathing comfortably on room air  Heart: regular rate and rhythm  Abdomen: Soft, mildly distended, moderately tender in left abdomen around her colostomy, no rebound/guarding, not peritonitic, no flatus appreciated, small amount of stool in bag, urostomy with clear yellow urine  Neurologic: Grossly normal      Labs  Labs reviewed and pertinent findings noted above    Imaging  Imaging reviewed and preliminary findings noted above      Assessment/Plan   60 y.o. female w/ PMHx of cervical CA s/p chemorads (15yrs remission) and PSHx of ex lap, GHAZALA, colostomy and ileal conduit creation (7/2024, cancer treatment centers), for active vaginal, vesicovaginal, urethrovaginal fistulas and no other significant past medical history presents to the ED with sudden onset abdominal pain, nausea/vomiting on 6/14.     CT with dilated loops of small bowel and mild gastric dilation.  Although she reports regular bowel function, no gas noted since symptom onset.  She continues to have nausea/vomiting in the ED concerning for SBO.    Plan:   - Admit to general surgery for NG tube decompression  - N.p.o., mIVF  - Pain control with serial exams  - Monitor ostomy output  - VTE Ppx      Final plan pending attending attestation    Code Status: No Order

## 2025-06-15 NOTE — ED PROVIDER NOTES
Emergency Medicine Note  HPI   HISTORY OF PRESENT ILLNESS     60-year-old female presenting with chief complaint of vomiting.  Reports earlier today she drank some sweet tea and began feeling nauseous.  Reports multiple episodes of nonbilious nonbloody vomit.  Also having generalized abdominal cramping.  No known fevers or chills.  No chest pain or Diffley breathing.      Vomiting  Associated symptoms: abdominal pain    Associated symptoms: no cough, no diarrhea, no fever and no headaches          Patient History   PAST HISTORY     Reviewed from Nursing Triage:       No past medical history on file.    No past surgical history on file.    No family history on file.           Review of Systems   REVIEW OF SYSTEMS     Review of Systems   Constitutional:  Negative for fever.   HENT:  Negative for congestion and rhinorrhea.    Respiratory:  Negative for cough and shortness of breath.    Cardiovascular:  Negative for chest pain and leg swelling.   Gastrointestinal:  Positive for abdominal pain and vomiting. Negative for diarrhea.   Neurological:  Negative for headaches.         VITALS     ED Vitals      Date/Time Temp Pulse Resp BP SpO2 [facility]   06/15/25 0354 -- -- 18 163/77 98 % DJC   06/14/25 2236 36.8 °C (98.3 °F) 85 17 123/69 99 % LH          Pulse Ox %: 99 % (06/14/25 2338)  Pulse Ox Interpretation: Normal (06/14/25 2338)  Heart Rate: 85 (06/14/25 2338)  Rhythm Strip Interpretation: Normal Sinus Rhythm (06/14/25 2338)     Physical Exam   PHYSICAL EXAM     Physical Exam  Vitals and nursing note reviewed.   Constitutional:       Appearance: She is not toxic-appearing or diaphoretic.   HENT:      Head: Normocephalic.      Mouth/Throat:      Mouth: Mucous membranes are moist.      Pharynx: Oropharynx is clear.   Eyes:      Pupils: Pupils are equal, round, and reactive to light.   Cardiovascular:      Rate and Rhythm: Normal rate and regular rhythm.      Pulses: Normal pulses.      Heart sounds: Normal heart sounds.    Pulmonary:      Effort: Pulmonary effort is normal.      Breath sounds: Normal breath sounds. No wheezing, rhonchi or rales.   Abdominal:      General: Abdomen is flat.      Palpations: Abdomen is soft.      Tenderness: There is abdominal tenderness. There is no guarding or rebound.   Musculoskeletal:         General: Normal range of motion.      Right lower leg: No edema.      Left lower leg: No edema.   Skin:     General: Skin is warm and dry.   Neurological:      Mental Status: She is alert.   Psychiatric:         Mood and Affect: Mood normal.         Behavior: Behavior normal.           PROCEDURES     Procedures     DATA     Results       Procedure Component Value Units Date/Time    UA w/ reflex culture (ED Only) [[id number]]  (Abnormal) Collected: 06/15/25 0354    Specimen: Urine, Clean Catch Updated: 06/15/25 0504    Narrative:      The following orders were created for panel order UA w/ reflex culture (ED Only).  Procedure                               Abnormality         Status                     ---------                               -----------         ------                     UA Reflex to Culture (UC...[[id number]]  Abnormal            Final result               UA Microscopic[[id number]]               Abnormal            Final result                 Please view results for these tests on the individual orders.    UA Microscopic [[id number]]  (Abnormal) Collected: 06/15/25 0354    Specimen: Urine, Clean Catch Updated: 06/15/25 0504     RBC, Urine 0 TO 4 /HPF      WBC, Urine 20 TO 35 /HPF      Squamous Epithelial None Seen /hpf      Hyaline Cast None Seen /lpf      Bacteria, Urine +1 /HPF      Renal Epithelial Rare /hpf      Triple Phosphate Crystals +1 /hpf     UA Reflex to Culture ([facility] Macroscopic) [[id number]]  (Abnormal) Collected: 06/15/25 0354    Specimen: Urine, Clean Catch Updated: 06/15/25 0448     Color, Urine Yellow     Clarity, Urine Clear     Specific Gravity, Urine 1.027     pH, Urine 8.0      Leukocyte Esterase +1     Nitrite, Urine Negative     Protein, Urine Trace     Comment: Trace False Positive Protein can be seen with alkaline or highly buffered urines or urine with high specific gravity. Suggest clinical correlation.        Glucose, Urine Negative mg/dL      Ketones, Urine Negative mg/dL      Urobilinogen, Urine 0.2 EU/dL      Bilirubin, Urine Negative mg/dL      Blood, Urine Negative     Comment: The sensitivity of the occult blood test is equivalent to approximately 4 intact RBC/HPF.       Magnesium [[id number]]  (Abnormal) Collected: 06/14/25 2330    Specimen: Blood, Venous Updated: 06/15/25 0010     Magnesium 1.5 mg/dL     Comprehensive metabolic panel [[id number]]  (Abnormal) Collected: 06/14/25 2330    Specimen: Blood, Venous Updated: 06/15/25 0007     Sodium 142 mEQ/L      Potassium 3.9 mEQ/L      Comment: Results obtained on plasma. Plasma Potassium values may be up to 0.4 mEQ/L less than serum values. The differences may be greater for patients with high platelet or white cell counts.        Chloride 112 mEQ/L      CO2 20 mEQ/L      BUN 23 mg/dL      Creatinine 0.7 mg/dL      Glucose 98 mg/dL      Calcium 9.4 mg/dL      AST (SGOT) 18 IU/L      ALT (SGPT) 19 IU/L      Alkaline Phosphatase 78 IU/L      Total Protein 6.9 g/dL      Comment: Test performed on plasma which typically contains approximately 0.4 g/dL more protein than serum.        Albumin 3.7 g/dL      Bilirubin, Total 0.6 mg/dL      eGFR >60.0 mL/min/1.73m*2      Comment: Calculation based on the Chronic Kidney Disease Epidemiology Collaboration (CKD-EPI) equation refit without adjustment for race.        Anion Gap 10 mEQ/L     Lipase [[id number]]  (Normal) Collected: 06/14/25 2330    Specimen: Blood, Venous Updated: 06/15/25 0007     Lipase 22 U/L     CBC and differential [[id number]]  (Abnormal) Collected: 06/14/25 2330    Specimen: Blood, Venous Updated: 06/14/25 2341     WBC 10.97 K/uL      RBC 3.97 M/uL      Hemoglobin 10.5  g/dL      Hematocrit 33.5 %      MCV 84.4 fL      MCH 26.4 pg      MCHC 31.3 g/dL      RDW 14.6 %      Platelets 269 K/uL      MPV 10.0 fL      Differential Type Auto     nRBC 0.0 %      Immature Granulocytes 0.2 %      Neutrophils 84.2 %      Lymphocytes 10.5 %      Monocytes 4.7 %      Eosinophils 0.3 %      Basophils 0.1 %      Immature Granulocytes, Absolute 0.02 K/uL      Neutrophils, Absolute 9.24 K/uL      Lymphocytes, Absolute 1.15 K/uL      Monocytes, Absolute 0.52 K/uL      Eosinophils, Absolute 0.03 K/uL      Basophils, Absolute 0.01 K/uL     Lactate, w/ reflex repeat if > 2.0 [[id number]]  (Normal) Collected: 06/14/25 2330    Specimen: Blood, Venous Updated: 06/14/25 2338     Lactate 0.9 mmol/L             Imaging Results              CT ABDOMEN PELVIS WITH IV CONTRAST (Preliminary result)  Result time 06/15/25 01:05:40      Preliminary result                   Narrative:    CLINICAL HISTORY: Abdominal abscess/infection suspected    COMMENT: IOPAMIDOL 370 MG IODINE/ML (76 %) INTRAVENOUS  SOLUTION:  80    Presumed right ileal conduit.    Mild bilateral renal caliectasis and dilatation of the  ureters.  There is a gas bubble in the left kidney, which  may be related to the presumed ileal conduit, but  infection with gas forming organism cannot be ruled out.  There is wall thickening and enhancement of the wall of  the right intrarenal collecting system, which may be  indicative of urothelial infection.    Somewhat distended, fluid-filled small bowel loops.  Other  small bowel loops are nondistended.  It is uncertain  whether the findings represent mild proximal or mid small  bowel obstruction or whether they may be due to enteritis.  Follow-up is recommended.    Apparent gallstone.    No free air.  No free fluid.        Interpreted by: Rosangela Santana MD Pavel 15 2025  2:32AM EST                                        ECG 12 lead          Scoring tools                                  ED Course & MDM   MDM /  ED COURSE / CLINICAL IMPRESSION / DISPO     Medical Decision Making  This is a nontoxic-appearing 60-year-old female presenting with vomiting and abdominal cramping.  Reports symptoms started after drinking sweet tea earlier today.  Vital signs within normal limits.  Exam as above.  Plan for labs and imaging as above.  Symptomatic control.  Close reassessment.    Problems Addressed:  SBO (small bowel obstruction) (CMS/HCC): complicated acute illness or injury with systemic symptoms that poses a threat to life or bodily functions    Amount and/or Complexity of Data Reviewed  Independent Historian: EMS  External Data Reviewed: notes.  Labs: ordered.  Radiology: ordered.  ECG/medicine tests: ordered.    Risk  Prescription drug management.  Decision regarding hospitalization.        ED Course as of 06/15/25 0510   Sun Pavel 15, 2025   0331 Patient reassessed at this time.  Continues to have severe abdominal pain, nausea/vomiting in ED.  Will give an additional dose of meds.  Speak with surgical team will speak with surgical team regarding CT findings. [LA]   0412 Spoke with surgical team.  Will evaluate patient. [LA]   0509 Evaluated by surgical team.  Will admit patient to surgical service. [LA]   0510 CRITICAL CARE NOTE:    5:10 AM  I have personally provided 35 minutes of critical care time exclusive of time spent on separately billable procedures. Time includes review of all data, discussion with consultants / admitting physicians, re-evaluation of patient, discussion with patient and/or family, review of all results, and monitoring for potential decompensation.   [LA]      ED Course User Index  [LA] Jatin Joaquin MD     Clinical Impression      SBO (small bowel obstruction) (CMS/HCC)     _________________       ED Disposition   Admit / Observation                       Jatin Joaquin MD  06/15/25 0510

## 2025-06-15 NOTE — PLAN OF CARE
Problem: Adult Inpatient Plan of Care  Goal: Optimal Comfort and Wellbeing  Outcome: Progressing  Intervention: Monitor Pain and Promote Comfort  Flowsheets (Taken 6/15/2025 1703)  Pain Management Interventions:   medication administered   pain management plan reviewed with patient/caregiver   pillow support provided   relaxation techniques promoted  Intervention: Provide Person-Centered Care  Flowsheets (Taken 6/15/2025 1703)  Trust Relationship/Rapport:   care explained   choices provided   empathic listening provided   questions answered   reassurance provided   thoughts/feelings acknowledged

## 2025-06-16 ENCOUNTER — APPOINTMENT (INPATIENT)
Dept: RADIOLOGY | Facility: HOSPITAL | Age: 60
DRG: 390 | End: 2025-06-16
Payer: COMMERCIAL

## 2025-06-16 LAB
ANION GAP SERPL CALC-SCNC: 7 MEQ/L (ref 3–15)
BUN SERPL-MCNC: 17 MG/DL (ref 7–25)
CALCIUM SERPL-MCNC: 9 MG/DL (ref 8.6–10.3)
CHLORIDE SERPL-SCNC: 117 MEQ/L (ref 98–107)
CO2 SERPL-SCNC: 22 MEQ/L (ref 21–31)
CREAT SERPL-MCNC: 0.6 MG/DL (ref 0.6–1.2)
EGFRCR SERPLBLD CKD-EPI 2021: >60 ML/MIN/1.73M*2
ERYTHROCYTE [DISTWIDTH] IN BLOOD BY AUTOMATED COUNT: 15.1 % (ref 11.7–14.4)
GLUCOSE SERPL-MCNC: 90 MG/DL (ref 70–99)
HCT VFR BLD AUTO: 29.5 % (ref 35–45)
HGB BLD-MCNC: 9.1 G/DL (ref 11.8–15.7)
MAGNESIUM SERPL-MCNC: 3.3 MG/DL (ref 1.8–2.5)
MCH RBC QN AUTO: 26.8 PG (ref 28–33.2)
MCHC RBC AUTO-ENTMCNC: 30.8 G/DL (ref 32.2–35.5)
MCV RBC AUTO: 86.8 FL (ref 83–98)
PHOSPHATE SERPL-MCNC: 4.5 MG/DL (ref 2.4–4.7)
PLATELET # BLD AUTO: 259 K/UL (ref 150–369)
PMV BLD AUTO: 11.2 FL (ref 9.4–12.3)
POTASSIUM SERPL-SCNC: 4.1 MEQ/L (ref 3.5–5.1)
RBC # BLD AUTO: 3.4 M/UL (ref 3.93–5.22)
SODIUM SERPL-SCNC: 146 MEQ/L (ref 136–145)
WBC # BLD AUTO: 5.78 K/UL (ref 3.8–10.5)

## 2025-06-16 PROCEDURE — 25800000 HC PHARMACY IV SOLUTIONS

## 2025-06-16 PROCEDURE — 36415 COLL VENOUS BLD VENIPUNCTURE: CPT

## 2025-06-16 PROCEDURE — 85027 COMPLETE CBC AUTOMATED: CPT

## 2025-06-16 PROCEDURE — 83735 ASSAY OF MAGNESIUM: CPT

## 2025-06-16 PROCEDURE — 63600000 HC DRUGS/DETAIL CODE

## 2025-06-16 PROCEDURE — 63700000 HC SELF-ADMINISTRABLE DRUG

## 2025-06-16 PROCEDURE — 82310 ASSAY OF CALCIUM: CPT

## 2025-06-16 PROCEDURE — 84100 ASSAY OF PHOSPHORUS: CPT

## 2025-06-16 PROCEDURE — 63600000 HC DRUGS/DETAIL CODE: Mod: JZ

## 2025-06-16 PROCEDURE — 74018 RADEX ABDOMEN 1 VIEW: CPT

## 2025-06-16 PROCEDURE — 21400000 HC ROOM AND CARE CCU/INTERMEDIATE

## 2025-06-16 PROCEDURE — 99232 SBSQ HOSP IP/OBS MODERATE 35: CPT | Performed by: SURGERY

## 2025-06-16 RX ORDER — DEXTROSE MONOHYDRATE, SODIUM CHLORIDE, AND POTASSIUM CHLORIDE 50; 1.49; 4.5 G/1000ML; G/1000ML; G/1000ML
INJECTION, SOLUTION INTRAVENOUS CONTINUOUS
Status: ACTIVE | OUTPATIENT
Start: 2025-06-16 | End: 2025-06-17

## 2025-06-16 RX ORDER — ACETAMINOPHEN 500 MG
500 TABLET ORAL EVERY 6 HOURS PRN
COMMUNITY

## 2025-06-16 RX ORDER — METHIMAZOLE 5 MG/1
5 TABLET ORAL 2 TIMES DAILY
COMMUNITY

## 2025-06-16 RX ORDER — PANTOPRAZOLE SODIUM 40 MG/10ML
40 INJECTION, POWDER, LYOPHILIZED, FOR SOLUTION INTRAVENOUS EVERY 24 HOURS
Status: DISCONTINUED | OUTPATIENT
Start: 2025-06-16 | End: 2025-06-18 | Stop reason: HOSPADM

## 2025-06-16 RX ORDER — TRAZODONE HYDROCHLORIDE 50 MG/1
25 TABLET ORAL NIGHTLY
Status: DISCONTINUED | OUTPATIENT
Start: 2025-06-16 | End: 2025-06-18 | Stop reason: HOSPADM

## 2025-06-16 RX ORDER — HYDRALAZINE HYDROCHLORIDE 20 MG/ML
5 INJECTION INTRAMUSCULAR; INTRAVENOUS ONCE
Status: COMPLETED | OUTPATIENT
Start: 2025-06-16 | End: 2025-06-16

## 2025-06-16 RX ADMIN — HYDROMORPHONE HYDROCHLORIDE 0.5 MG: 1 INJECTION, SOLUTION INTRAMUSCULAR; INTRAVENOUS; SUBCUTANEOUS at 07:02

## 2025-06-16 RX ADMIN — HYDRALAZINE HYDROCHLORIDE 5 MG: 20 INJECTION INTRAMUSCULAR; INTRAVENOUS at 14:16

## 2025-06-16 RX ADMIN — KETOROLAC TROMETHAMINE 15 MG: 15 INJECTION, SOLUTION INTRAMUSCULAR; INTRAVENOUS at 08:03

## 2025-06-16 RX ADMIN — HYDROMORPHONE HYDROCHLORIDE 0.5 MG: 1 INJECTION, SOLUTION INTRAMUSCULAR; INTRAVENOUS; SUBCUTANEOUS at 15:08

## 2025-06-16 RX ADMIN — HYDROMORPHONE HYDROCHLORIDE 0.5 MG: 1 INJECTION, SOLUTION INTRAMUSCULAR; INTRAVENOUS; SUBCUTANEOUS at 03:00

## 2025-06-16 RX ADMIN — HYDROMORPHONE HYDROCHLORIDE 0.5 MG: 1 INJECTION, SOLUTION INTRAMUSCULAR; INTRAVENOUS; SUBCUTANEOUS at 10:58

## 2025-06-16 RX ADMIN — PANTOPRAZOLE SODIUM 40 MG: 40 INJECTION, POWDER, FOR SOLUTION INTRAVENOUS at 08:03

## 2025-06-16 RX ADMIN — HYDROMORPHONE HYDROCHLORIDE 0.5 MG: 1 INJECTION, SOLUTION INTRAMUSCULAR; INTRAVENOUS; SUBCUTANEOUS at 19:47

## 2025-06-16 RX ADMIN — POTASSIUM CHLORIDE, DEXTROSE MONOHYDRATE AND SODIUM CHLORIDE: 150; 5; 450 INJECTION, SOLUTION INTRAVENOUS at 08:14

## 2025-06-16 RX ADMIN — POTASSIUM CHLORIDE, DEXTROSE MONOHYDRATE AND SODIUM CHLORIDE: 150; 5; 450 INJECTION, SOLUTION INTRAVENOUS at 18:42

## 2025-06-16 RX ADMIN — TRAZODONE HYDROCHLORIDE 25 MG: 50 TABLET ORAL at 21:11

## 2025-06-16 RX ADMIN — HEPARIN SODIUM 5000 UNITS: 5000 INJECTION, SOLUTION INTRAVENOUS; SUBCUTANEOUS at 21:10

## 2025-06-16 RX ADMIN — KETOROLAC TROMETHAMINE 15 MG: 15 INJECTION, SOLUTION INTRAMUSCULAR; INTRAVENOUS at 02:03

## 2025-06-16 RX ADMIN — POTASSIUM CHLORIDE, DEXTROSE MONOHYDRATE AND SODIUM CHLORIDE: 150; 5; 450 INJECTION, SOLUTION INTRAVENOUS at 05:56

## 2025-06-16 RX ADMIN — PHENOL 1 SPRAY: 1.5 LIQUID ORAL at 02:04

## 2025-06-16 RX ADMIN — KETOROLAC TROMETHAMINE 15 MG: 15 INJECTION, SOLUTION INTRAMUSCULAR; INTRAVENOUS at 13:59

## 2025-06-16 RX ADMIN — KETOROLAC TROMETHAMINE 15 MG: 15 INJECTION, SOLUTION INTRAMUSCULAR; INTRAVENOUS at 21:16

## 2025-06-16 RX ADMIN — HEPARIN SODIUM 5000 UNITS: 5000 INJECTION, SOLUTION INTRAVENOUS; SUBCUTANEOUS at 06:00

## 2025-06-16 ASSESSMENT — COGNITIVE AND FUNCTIONAL STATUS - GENERAL
MOVING TO AND FROM BED TO CHAIR: 4 - NONE
STANDING UP FROM CHAIR USING ARMS: 4 - NONE
STANDING UP FROM CHAIR USING ARMS: 4 - NONE
MOVING TO AND FROM BED TO CHAIR: 4 - NONE
WALKING IN HOSPITAL ROOM: 3 - A LITTLE
CLIMB 3 TO 5 STEPS WITH RAILING: 4 - NONE
CLIMB 3 TO 5 STEPS WITH RAILING: 3 - A LITTLE
WALKING IN HOSPITAL ROOM: 4 - NONE

## 2025-06-16 NOTE — PLAN OF CARE
Care Coordination Admission Assessment Note    General Information:  Readmission Within the last 30 days: no previous admission in last 30 days  Does patient have a : No  Patient-Specific Goals (include timeframe): to go home    Living Arrangements:  Arrived From: home  Current Living Arrangements: home  People in Home: parent(s)  Home Accessibility: stairs to enter home (Group), stairs within home (Group)  Living Arrangement Comments: pt lives with mother in [facility] with [address], bathroom on 1st floor. pt's b/b on 2nd floor    Housing Stability and Utility Access (SDOH):  In the last 12 months, was there a time when you were not able to pay the mortgage or rent on time?: No  In the past 12 months, how many times have you moved?: 0  At any time in the past 12 months, were you homeless or living in a shelter (including now)?: No  In the past 12 months has the electric, gas, oil, or water company threatened to shut off services in your home?: No    Functional Status Prior to Admission:   Assistive Device/Animal Currently Used at Home: cane, straight  Functional Status Comments: ambulates independently with cane  IADL Comments: indep     Supports and Services:  Current Outpatient/Agency/Support Group: none  Type of Current Home Care Services: none  History of home care episode or rehab stay: none    Discharge Needs Assessment:   Concerns to be Addressed:    Current Discharge Risk: none  Anticipated Changes Related to Illness: none    Patient/Family Anticipated Discharge Plan:  Patient/Family Anticipates Transition To: home  Patient/Family Anticipated Services at Transition: none    Connection to Community       Patient Choice:   Offered/Gave Vendor List:         Anticipated Discharge Plan:  Met with patient. Provided education and contact information for Care Coordination services.: yes  Anticipated Discharge Disposition: home with assistance     Transportation Needs (SDOH):  Transportation Concerns:  none  Transportation Anticipated: family or friend will provide  Is Out of Hospital DNR needed at discharge?:      In the past 12 months, has lack of transportation kept you from medical appointments or from getting medications?: No  In the past 12 months, has lack of transportation kept you from meetings, work, or from getting things needed for daily living?: No    Concerns - comments: assessment completed with pt via phone - name, , address, insurance and contact verified. pt stated she has a PCP - dr Jordan (pt was unable to recall last name or #). info  for pharm provided by pt updated in [facility]. can obtain own transport at discharge

## 2025-06-16 NOTE — PROGRESS NOTES
Spoke with patient and confirmed with medication list from [facility] and/or patient's own pharmacy to complete the medication reconciliation.     Prior to admission medication list:    Medications Prior to Admission:     methIMAzole (TAPAZOLE) 5 mg tablet, Take 5 mg by mouth 2 (two) times a day.    acetaminophen (TYLENOL) 500 mg tablet, Take 500 mg by mouth every 6 (six) hours as needed for pain.    Compliance:   - Confirmed medication history with [facility].

## 2025-06-16 NOTE — PROGRESS NOTES
Acute Care Surgery Service (Pager: [phone])     Progress Note    Subjective   60 y.o. F a/f pSBO    Interval History: Patient found resting comfortably in bed, there were no acute events overnight.  NGT in place output 1525 cc.  Good urine output.  Colostomy output 0cc. During rounds with Dr. Lyon this AM, a small bowel follow through exam was recommended and the patient refused. We also educated the patient on the need to limit the ice chips to avoid over estimation of NGT output. However patient refused the small bowel follow through exam and also refused to moderate on ice chip intake.    Objective     Vitals:    06/16/25 0800   BP: (!) 175/80   Pulse: 90   Resp: 16   Temp: 36.5 °C (97.7 °F)   SpO2: 97%         Intake/Output Summary (Last 24 hours) at 6/16/2025 0858  Last data filed at 6/16/2025 0800  Gross per 24 hour   Intake 2880 ml   Output 3050 ml   Net -170 ml       I/O this shift:  In: 305 [I.V.:275; NG/GT:30]  Out: 200 [Emesis/NG output:200]    General: awake and alert,   HEENT: EOM intact, sclera nonicteric,   CV: Regular rate and rhythm,   Pulm: No increased work of breathing,   Abd: soft, not tender, not distended,   Extr: no obvious abnormality,   Neuro: Grossly normal,     Labs:  Results from last 7 days   Lab Units 06/16/25 0416 06/15/25  0611 06/14/25  2330   WBC K/uL 5.78 8.71 10.97*   HEMOGLOBIN g/dL 9.1* 10.3* 10.5*   HEMATOCRIT % 29.5* 33.3* 33.5*   PLATELETS K/uL 259 263 269     Results from last 7 days   Lab Units 06/16/25  0416 06/15/25  0611 06/14/25  2330   SODIUM mEQ/L 146* 143 142   POTASSIUM mEQ/L 4.1 3.9 3.9   CHLORIDE mEQ/L 117* 115* 112*   CO2 mEQ/L 22 18* 20*   BUN mg/dL 17 20 23   CREATININE mg/dL 0.6 0.6 0.7   CALCIUM mg/dL 9.0 9.1 9.4   ALBUMIN g/dL  --   --  3.7   BILIRUBIN TOTAL mg/dL  --   --  0.6   ALK PHOS IU/L  --   --  78   ALT IU/L  --   --  19   AST IU/L  --   --  18   GLUCOSE mg/dL 90 105* 98               Imaging:  I have reviewed the Imaging from the last 24  hrs.  X-RAY ABDOMEN 1 VIEW  Result Date: 6/15/2025  CLINICAL HISTORY:  xray kub to eval ngt placement COMMENT: Views: Portable frontal upper abdomen Comparison date: CT earlier same day. NG tube is seen overlying the body of the left melany-abdominal region. Osseous structures are intact.. Visualized bowel gas pattern is nonspecific.     IMPRESSION: NG tube is seen overlying the left melany-abdominal region.  Correlate clinically with function.  The CT evaluation does demonstrate a vertically oriented stomach       Assessment:  60 y.o. F a/f pSBO    Plan:  -NGT to LIS  - Ice chips in few quantities, maybe 4 chips per day, are okay, but without exceeding  - SBFT  - mIVF  - Pain control with Dilaudid 0.5 mg IV every 4 hours as needed, Toradol 15 mg IV every 6 hours as needed  - Monitor ostomy output  - strict I/Os  - VTE ppx: Heparin subcu 5000 units every 8 hours  - Daily morning labs    Jennifer Toro MD  General Surgery Resident  [facility]  [facility]  Please page [phone] with any questions or concerns.

## 2025-06-16 NOTE — PROGRESS NOTES
Came to assess for abd pain.  Exam unchanged from this am..NGT for decompression. Reports she is agreeable now for SBFT 6/17.

## 2025-06-16 NOTE — CONSULTS
Nutrition Recommendations     Currently NPO day 2  - as medically able, clears --> regular    2.  If unable to tolerate diet advance by 6/19 (NPO day 5), consider TPN via dedicated central line 2/2 reported weight loss over 3 months  - if TPN, may be at risk for refeeding syndrome w/ possible malnutrition; check daily serum mag/phos/K+           Nutrition Assessment  Clinical Course: Patient is a 60 y.o. female who was admitted on 6/14/2025 with a diagnosis of SBO (small bowel obstruction) (CMS/HCC) [K56.609]. \  Admit w/ n/v/abdominal pain  CTA c/w dilated bowel loops  6/16 24h NGT = 1825 ml; pt refused SBFT      No past medical history on file.  No past surgical history on file.    Reason for Assessment  Reason For Assessment: identified at risk by screening criteria (MST, low BMI)     [facility] Nutrition Screen Tool  Has patient lost weight without trying?: 4-->Yes, 34 lbs or more  Has patient been eating poorly due to decreased appetite?: 1-->Yes  [facility] Nutrition Screen Score: 5            [id number] Physical Appearance  Overall Physical Appearance: underweight  Last Bowel Movement: 06/14/25 (per Pt)  Colostomy = 0  Skin: intact, edema   NGT 24h = 1825 ml    Nutrition Order  Nutrition Order: does not meet nutritional requirements  Nutrition Order Comments: NPO day 2     Anthropometrics  Height: 152.4 cm (5')           Current Weight  Weight Method: Bed scale  Weight: 42.5 kg (93 lb 11.2 oz)     Ideal Body Weight (IBW)  Ideal Body Weight (IBW) (kg): 45.86  % Ideal Body Weight: 92.68     Usual Body Weight (UBW)  Usual Body Weight: 49 kg (108 lb)  Weight Loss: unintentional  Time Frame: 3 Months      Body Mass Index (BMI)  BMI (Calculated): 18.3         BMP Results         06/16/25 06/15/25 06/14/25     0416 0611 2330     143 142    K 4.1 3.9 3.9    Cl 117 115 112    CO2 22 18 20    Glucose 90 105 98    BUN 17 20 23    Creatinine 0.6 0.6 0.7    Calcium 9.0 9.1 9.4    Anion Gap 7 10 10    EGFR >60.0 >60.0 >60.0            "Comment for K at 0611 on 06/15/25: Results obtained on plasma. Plasma Potassium values may be up to 0.4 mEQ/L less than serum values. The differences may be greater for patients with high platelet or white cell counts.    Comment for K at 2330 on 06/14/25: Results obtained on plasma. Plasma Potassium values may be up to 0.4 mEQ/L less than serum values. The differences may be greater for patients with high platelet or white cell counts.    Comment for EGFR at 0416 on 06/16/25: Calculation based on the Chronic Kidney Disease Epidemiology Collaboration (CKD-EPI) equation refit without adjustment for race.    Comment for EGFR at 0611 on 06/15/25: Calculation based on the Chronic Kidney Disease Epidemiology Collaboration (CKD-EPI) equation refit without adjustment for race.    Comment for EGFR at 2330 on 06/14/25: Calculation based on the Chronic Kidney Disease Epidemiology Collaboration (CKD-EPI) equation refit without adjustment for race.         6/16 mag 3.3, phos 4.5         Medications  Pertinent Medications Reviewed: reviewed, pertinent    potassium chloride-D5-0.45%NaCl   100 mL/hr at 06/16/25 0814       heparin (porcine)  5,000 Units subcutaneous q8h BRYCE    hydrALAZINE  5 mg intravenous Once    pantoprazole  40 mg intravenous q24h              Calorie Requirements  Estimated kCal Needs: Actual Body Weight  Estimated Calorie Need Method: kcal/kg  Calorie/kg Recommended: 30-35  Calorie Recommendations: 2682-5106     Protein Requirements  Recommended Dosing Weight (Estimated Protein Needs): Actual Body Weight  Est Protein Requirement Amount (gms/kg):  (1.5)  Protein Recommendations: 65     Fluid requirements:  30-35 ml/kg = 8242-6953 ml    ABW 42.5 kg  NFPE:  underweight  Refusing most of RD interview.  \"I don't want to talk; they won;t give me my thyroid medication\".  Hypernatremia/free water deficit; currently getting D5 1/2 NS.  24h UOP = 1175 ml    Noted pt refusing SBFT today and refusing to decrease ice chip " intake.    Reported severe weight loss of 14% over 3 months; c/f malnutrition but unable to objectively determine.        Goals:  diet vs TPN in 72h  Monitor:  refeeding labs, diet advance/tolerance    Recommendations: See above     PES  Statement: PES Statement  Nutrition Diagnosis: Inadequate Energy Intake  Related To:: Decreased ability to consume sufficient energy  As Evidenced By:: NPO 2/2 dysfunctional gut  Nutritional Needs Met?: No                                   Date: 06/16/25  Signature: Vero Hinds RD

## 2025-06-16 NOTE — PLAN OF CARE
NPO day 2 w/ dysfunctional gut; goal nutrition in 72h (day 5)    Problem: Adult Inpatient Plan of Care  Goal: Plan of Care Review  Outcome: Progressing

## 2025-06-16 NOTE — PLAN OF CARE
Problem: Adult Inpatient Plan of Care  Goal: Plan of Care Review  Outcome: Progressing  Flowsheets (Taken 6/16/2025 0513)  Progress: no change  Outcome Evaluation: Pt received at 2300.  AAOX4. SBP 140s -160s. On RA, no SOB and chest pain reported. PRN Toradol and Dilaudid administered for abd pain. NPO, IVF infusing. NGT to LCWS, producing dark-brown output. Urostomy producing cloudy yellow urine. Ostomy in place, without stools and flatus. Standby assist to the bathroom. Bed alarm, call bell within reach.  Plan of Care Reviewed With: patient  Goal: Patient-Specific Goal (Individualized)  Outcome: Progressing

## 2025-06-17 ENCOUNTER — APPOINTMENT (INPATIENT)
Dept: RADIOLOGY | Facility: HOSPITAL | Age: 60
DRG: 390 | End: 2025-06-17
Payer: COMMERCIAL

## 2025-06-17 LAB
ANION GAP SERPL CALC-SCNC: 8 MEQ/L (ref 3–15)
BUN SERPL-MCNC: 14 MG/DL (ref 7–25)
CALCIUM SERPL-MCNC: 8.4 MG/DL (ref 8.6–10.3)
CHLORIDE SERPL-SCNC: 113 MEQ/L (ref 98–107)
CO2 SERPL-SCNC: 20 MEQ/L (ref 21–31)
CREAT SERPL-MCNC: 0.5 MG/DL (ref 0.6–1.2)
EGFRCR SERPLBLD CKD-EPI 2021: >60 ML/MIN/1.73M*2
ERYTHROCYTE [DISTWIDTH] IN BLOOD BY AUTOMATED COUNT: 14.9 % (ref 11.7–14.4)
GLUCOSE SERPL-MCNC: 80 MG/DL (ref 70–99)
HCT VFR BLD AUTO: 27.4 % (ref 35–45)
HGB BLD-MCNC: 8.3 G/DL (ref 11.8–15.7)
MAGNESIUM SERPL-MCNC: 2.1 MG/DL (ref 1.8–2.5)
MCH RBC QN AUTO: 26.6 PG (ref 28–33.2)
MCHC RBC AUTO-ENTMCNC: 30.3 G/DL (ref 32.2–35.5)
MCV RBC AUTO: 87.8 FL (ref 83–98)
PHOSPHATE SERPL-MCNC: 4 MG/DL (ref 2.4–4.7)
PLATELET # BLD AUTO: 215 K/UL (ref 150–369)
PMV BLD AUTO: 11.1 FL (ref 9.4–12.3)
POTASSIUM SERPL-SCNC: 4.2 MEQ/L (ref 3.5–5.1)
RBC # BLD AUTO: 3.12 M/UL (ref 3.93–5.22)
SODIUM SERPL-SCNC: 141 MEQ/L (ref 136–145)
WBC # BLD AUTO: 6.24 K/UL (ref 3.8–10.5)

## 2025-06-17 PROCEDURE — 85027 COMPLETE CBC AUTOMATED: CPT

## 2025-06-17 PROCEDURE — 36415 COLL VENOUS BLD VENIPUNCTURE: CPT

## 2025-06-17 PROCEDURE — 84100 ASSAY OF PHOSPHORUS: CPT

## 2025-06-17 PROCEDURE — 80048 BASIC METABOLIC PNL TOTAL CA: CPT

## 2025-06-17 PROCEDURE — 74250 X-RAY XM SM INT 1CNTRST STD: CPT

## 2025-06-17 PROCEDURE — 63600000 HC DRUGS/DETAIL CODE: Mod: JZ

## 2025-06-17 PROCEDURE — 63700000 HC SELF-ADMINISTRABLE DRUG: Performed by: NURSE PRACTITIONER

## 2025-06-17 PROCEDURE — 63700000 HC SELF-ADMINISTRABLE DRUG

## 2025-06-17 PROCEDURE — 21400000 HC ROOM AND CARE CCU/INTERMEDIATE

## 2025-06-17 PROCEDURE — 99232 SBSQ HOSP IP/OBS MODERATE 35: CPT | Performed by: SURGERY

## 2025-06-17 PROCEDURE — 83735 ASSAY OF MAGNESIUM: CPT

## 2025-06-17 PROCEDURE — 63600000 HC DRUGS/DETAIL CODE

## 2025-06-17 RX ORDER — ACETAMINOPHEN 325 MG/1
650 TABLET ORAL ONCE
Status: COMPLETED | OUTPATIENT
Start: 2025-06-17 | End: 2025-06-17

## 2025-06-17 RX ORDER — METHIMAZOLE 5 MG/1
5 TABLET ORAL EVERY 12 HOURS
Status: DISCONTINUED | OUTPATIENT
Start: 2025-06-17 | End: 2025-06-18 | Stop reason: HOSPADM

## 2025-06-17 RX ORDER — DIATRIZOATE MEGLUMINE AND DIATRIZOATE SODIUM 660; 100 MG/ML; MG/ML
360 SOLUTION ORAL; RECTAL
Status: COMPLETED | OUTPATIENT
Start: 2025-06-17 | End: 2025-06-17

## 2025-06-17 RX ADMIN — PANTOPRAZOLE SODIUM 40 MG: 40 INJECTION, POWDER, FOR SOLUTION INTRAVENOUS at 08:32

## 2025-06-17 RX ADMIN — HYDROMORPHONE HYDROCHLORIDE 0.5 MG: 1 INJECTION, SOLUTION INTRAMUSCULAR; INTRAVENOUS; SUBCUTANEOUS at 10:46

## 2025-06-17 RX ADMIN — ACETAMINOPHEN 650 MG: 325 TABLET ORAL at 05:41

## 2025-06-17 RX ADMIN — TRAZODONE HYDROCHLORIDE 25 MG: 50 TABLET ORAL at 21:15

## 2025-06-17 RX ADMIN — KETOROLAC TROMETHAMINE 15 MG: 15 INJECTION, SOLUTION INTRAMUSCULAR; INTRAVENOUS at 08:32

## 2025-06-17 RX ADMIN — HEPARIN SODIUM 5000 UNITS: 5000 INJECTION, SOLUTION INTRAVENOUS; SUBCUTANEOUS at 05:41

## 2025-06-17 RX ADMIN — METHIMAZOLE 5 MG: 5 TABLET ORAL at 21:15

## 2025-06-17 RX ADMIN — DIATRIZOATE MEGLUMINE AND DIATRIZOATE SODIUM 360 ML: 660; 100 LIQUID ORAL; RECTAL at 09:11

## 2025-06-17 ASSESSMENT — COGNITIVE AND FUNCTIONAL STATUS - GENERAL
WALKING IN HOSPITAL ROOM: 4 - NONE
STANDING UP FROM CHAIR USING ARMS: 4 - NONE
CLIMB 3 TO 5 STEPS WITH RAILING: 4 - NONE
STANDING UP FROM CHAIR USING ARMS: 4 - NONE
CLIMB 3 TO 5 STEPS WITH RAILING: 4 - NONE
WALKING IN HOSPITAL ROOM: 4 - NONE
MOVING TO AND FROM BED TO CHAIR: 4 - NONE
MOVING TO AND FROM BED TO CHAIR: 4 - NONE

## 2025-06-17 NOTE — PROGRESS NOTES
Came to assess patient, as she was advanced to regular diet, and RN called that she was requesting pain meds.  Pt sitting in bed eating dinner. She reported that she had a late breakfast of cheesy eggs/bills/Russian toast and for dinner eating chicken rice/water ice.  She continues to have ostomy output.  Her abdomen remains soft, NT, ND. Informed that there was no indication for pain medications given her bowel obstruction obstruction has resolved based on her sbft.  She reported that she was not ready to go home and would leave tomorrow am.

## 2025-06-17 NOTE — DISCHARGE INSTRUCTIONS
[facility] General Surgery Discharge Instructions: SMALL BOWEL OBSTRUCTION    You were in the hospital because you had a blockage in your bowel (intestine). While in the hospital, you received intravenous (IV) fluids. You also had a tube placed through your nose and into your stomach.  You underwent a series of Xrays after receiving contrast through the tube to determine if the obstruction resolved . This study confirmed resolution of the obstruction, so your health care providers advanced your diet to regular food given you were tolerating clears prior to the study.       What to Expect at Home  If you did not have surgery:  Your symptoms may be completely gone. Or, you may still have some discomfort, and your stomach may still feel bloated. There is a chance your intestine may become blocked again.  Self-care  Be sure to follow the diet advice given to you by your provider.  Eat small amounts of food several times a day. DO NOT eat 3 large meals. You should:  Space out your small meals. Wait the same amount of time between each one.  Add new foods back into your diet slowly, one or two at a time.  Take sips of clear liquids throughout the day.   Some foods may cause gas, loose stools, or constipation as you recover. Avoid foods that cause these problems.  If you become sick to your stomach or have diarrhea, avoid solid foods for a while and try drinking only clear fluids.  DO NOT lift anything or do intense exercise for at least 4 to 6 weeks, or until your doctor says it is okay.    When to Call the Doctor  Call your health care provider if you have:  Vomiting or nausea  Diarrhea that does not go away  Pain that does not go away or is getting worse  A swollen or tender belly  Little or no gas or stools to pass  Fever or chills  Blood in your stool     Medications:  Resume all home medications unless otherwise instructed.     Follow Up Appointment:  You do NOT need to follow up with the surgery team unless  you feel it is necessary.   If you want to schedule a follow-up appointment with your surgeon, please call the office at [phone] to make an appointment.  You may also call the office with any other questions or concerns.   Follow-up with your PCP and other specialists as scheduled.

## 2025-06-17 NOTE — PROGRESS NOTES
Acute Care Surgery Service. Pager: [phone]   DAILY PROGRESS NOTE      Subjective   60 y.o. female w/ PMHx of cervical CA s/p chemorads (15yrs remission) and PSHx of ex lap, GHAZALA, colostomy and ileal conduit creation (7/2024, [facility]), for active vaginal, vesicovaginal, urethrovaginal fistulas and no other significant past medical history presents to the ED with sudden onset abdominal pain, nausea/vomiting on 6/14.  She states she tolerated breakfast well and had her normal ostomy output prior to that.  She did not notice gas in her bag since the afternoon.  She had tea with lunch just prior to symptom onset.  She reports persistent, intermittent, cramping diffuse abdominal pain.  She denies similar symptoms in the past or history of obstruction.                Interval History:  VSS afebrile.  Pt removed NG last eveining, had it re-inserted only to remove it again then refused to have it put in.  She has been obstructive in her care.  Refused SBFT yesterday then agreeable at the end of the day so ordered for this am.  Drank most of the contrast although per nurse had episode of emesis. Starting to have bowel function w/  stool in stoma 200 ml out over night, already 1800 out on day shift.   Reviewed results of SBFT w/ attending and contrast reached colon so was advanced to regular diet given she had essentially been drinking water since admission        Objective     Vital signs in last 24 hours:  Temp:  [36.3 °C (97.4 °F)-37.2 °C (98.9 °F)] 36.3 °C (97.4 °F)  Heart Rate:  [75-98] 80  Resp:  [15-20] 18  BP: (116-184)/(59-88) 184/80       Intake/Output Summary (Last 24 hours) at 6/17/2025 1307  Last data filed at 6/17/2025 1132  Gross per 24 hour   Intake 480 ml   Output 3750 ml   Net -3270 ml      Intake/Output this shift:  I/O this shift:  In: -   Out: 2800 [Urine:600; Emesis/NG output:400; Stool:1800]        Labs:  Results from last 7 days   Lab Units 06/17/25  0532 06/16/25  0416 06/15/25  0611  06/14/25  2330   SODIUM mEQ/L 141 146* 143 142   POTASSIUM mEQ/L 4.2 4.1 3.9 3.9   CHLORIDE mEQ/L 113* 117* 115* 112*   CO2 mEQ/L 20* 22 18* 20*   BUN mg/dL 14 17 20 23   CREATININE mg/dL 0.5* 0.6 0.6 0.7   CALCIUM mg/dL 8.4* 9.0 9.1 9.4   ALBUMIN g/dL  --   --   --  3.7   BILIRUBIN TOTAL mg/dL  --   --   --  0.6   ALK PHOS IU/L  --   --   --  78   ALT IU/L  --   --   --  19   AST IU/L  --   --   --  18   GLUCOSE mg/dL 80 90 105* 98     Results from last 7 days   Lab Units 06/17/25  0532 06/16/25  0416 06/15/25  0611 06/14/25  2330   WBC K/uL 6.24 5.78 8.71 10.97*   HEMOGLOBIN g/dL 8.3* 9.1* 10.3* 10.5*   HEMATOCRIT % 27.4* 29.5* 33.3* 33.5*   PLATELETS K/uL 215 259 263 269   DIFF TYPE   --   --   --  Auto   NRBC %  --   --   --  0.0   IMM GRANULOCYTES %  --   --   --  0.2   NEUTROPHILS %  --   --   --  84.2   LYMPHOCYTES %  --   --   --  10.5   MONOCYTES %  --   --   --  4.7   EOSINOPHILS %  --   --   --  0.3   BASOPHILS %  --   --   --  0.1   IMM GRANUCOCYTES ABS K/uL  --   --   --  0.02   MONO ABS AUTO K/uL  --   --   --  0.52   EOS ABS AUTO K/uL  --   --   --  0.03*   BASO ABS AUTO K/uL  --   --   --  0.01         Imaging:  I have reviewed the Imaging from the last 24 hrs.  X-RAY ABDOMEN 1 VIEW  Result Date: 6/16/2025  CLINICAL HISTORY: Status post nasogastric tube placement, assess position. COMMENT: A single supine AP radiograph centered on the upper abdomen was obtained for purposes of assessment of feeding tube position. A nasogastric type feeding tube is present with the distal end projecting  in the left upper quadrant presumably within the stomach.     IMPRESSION: Nasogastric tube positioned within the stomach.          Physical Exam:  General appearance: alert, appears stated age, uncooperative  Pulm: No increased work of breathing  Heart: Regular rate and rhythm.   Abdomen: soft, non-tender, non-distended Stoma w/ liquid stool  Extremities: extremities warm and well-perfused.  Skin: Skin color, texture,  turgor normal.  Neurologic: Grossly normal. Moves all four extremities spontaneously.         Assessment/Plan   60 y.o. female w/ PMHx of cervical CA s/p chemorads (15yrs remission) and PSHx of ex lap, GHAZALA, colostomy and ileal conduit creation (7/2024, cancer treatment centers), for active vaginal, vesicovaginal, urethrovaginal fistulas and no other significant past medical history presents to the ED with sudden onset abdominal pain, nausea/vomiting on 6/14.  She states she tolerated breakfast well and had her normal ostomy output prior to that.  She did not notice gas in her bag since the afternoon.  She had tea with lunch just prior to symptom onset.  She reports persistent, intermittent, cramping diffuse abdominal pain.  She denies similar symptoms in the past or history of obstruction.        - Given results SBFT - advanced diet to reg  - check for diet tolerance  - SQH for DVT PPX  - resume tapazole  - if continues to tolerate diet likely can dc later this evening      -Final plan pending Attending attestation.    ANTHONY Lerma  General Surgery [place]  Please page *ACS [phone] with any questions or concerns.

## 2025-06-17 NOTE — PLAN OF CARE
Plan of Care Review  Plan of Care Reviewed With: patient  Progress: improving  Outcome Evaluation: Pt off floor for SBFT. Pending results. Advance diet as tolerated.

## 2025-06-18 VITALS
BODY MASS INDEX: 18.4 KG/M2 | TEMPERATURE: 98.1 F | DIASTOLIC BLOOD PRESSURE: 75 MMHG | WEIGHT: 93.7 LBS | OXYGEN SATURATION: 95 % | SYSTOLIC BLOOD PRESSURE: 149 MMHG | HEART RATE: 79 BPM | RESPIRATION RATE: 18 BRPM | HEIGHT: 60 IN

## 2025-06-18 LAB
ANION GAP SERPL CALC-SCNC: 6 MEQ/L (ref 3–15)
BACTERIA UR CULT: ABNORMAL
BUN SERPL-MCNC: 14 MG/DL (ref 7–25)
CALCIUM SERPL-MCNC: 8.9 MG/DL (ref 8.6–10.3)
CHLORIDE SERPL-SCNC: 111 MEQ/L (ref 98–107)
CO2 SERPL-SCNC: 23 MEQ/L (ref 21–31)
CREAT SERPL-MCNC: 0.6 MG/DL (ref 0.6–1.2)
EGFRCR SERPLBLD CKD-EPI 2021: >60 ML/MIN/1.73M*2
ERYTHROCYTE [DISTWIDTH] IN BLOOD BY AUTOMATED COUNT: 14.7 % (ref 11.7–14.4)
GLUCOSE SERPL-MCNC: 78 MG/DL (ref 70–99)
HCT VFR BLD AUTO: 34 % (ref 35–45)
HGB BLD-MCNC: 10.4 G/DL (ref 11.8–15.7)
MAGNESIUM SERPL-MCNC: 1.8 MG/DL (ref 1.8–2.5)
MCH RBC QN AUTO: 26.5 PG (ref 28–33.2)
MCHC RBC AUTO-ENTMCNC: 30.6 G/DL (ref 32.2–35.5)
MCV RBC AUTO: 86.7 FL (ref 83–98)
PHOSPHATE SERPL-MCNC: 3.8 MG/DL (ref 2.4–4.7)
PLATELET # BLD AUTO: 228 K/UL (ref 150–369)
PMV BLD AUTO: 10.8 FL (ref 9.4–12.3)
POTASSIUM SERPL-SCNC: 4.1 MEQ/L (ref 3.5–5.1)
RBC # BLD AUTO: 3.92 M/UL (ref 3.93–5.22)
SODIUM SERPL-SCNC: 140 MEQ/L (ref 136–145)
WBC # BLD AUTO: 6.1 K/UL (ref 3.8–10.5)

## 2025-06-18 PROCEDURE — 84100 ASSAY OF PHOSPHORUS: CPT

## 2025-06-18 PROCEDURE — 85027 COMPLETE CBC AUTOMATED: CPT

## 2025-06-18 PROCEDURE — 63700000 HC SELF-ADMINISTRABLE DRUG: Performed by: NURSE PRACTITIONER

## 2025-06-18 PROCEDURE — 99238 HOSP IP/OBS DSCHRG MGMT 30/<: CPT | Performed by: STUDENT IN AN ORGANIZED HEALTH CARE EDUCATION/TRAINING PROGRAM

## 2025-06-18 PROCEDURE — 36415 COLL VENOUS BLD VENIPUNCTURE: CPT

## 2025-06-18 PROCEDURE — 83735 ASSAY OF MAGNESIUM: CPT

## 2025-06-18 PROCEDURE — 80048 BASIC METABOLIC PNL TOTAL CA: CPT

## 2025-06-18 RX ORDER — ACETAMINOPHEN 325 MG/1
650 TABLET ORAL ONCE
Status: COMPLETED | OUTPATIENT
Start: 2025-06-18 | End: 2025-06-18

## 2025-06-18 RX ADMIN — ACETAMINOPHEN 650 MG: 325 TABLET ORAL at 07:54

## 2025-06-18 RX ADMIN — METHIMAZOLE 5 MG: 5 TABLET ORAL at 07:57

## 2025-06-18 NOTE — NURSING NOTE
No events overnight. Patient changed and emptied urostomy and colostomy bags independently. No nausea or vomiting, tolerating diet. No pain. VSS.

## 2025-06-18 NOTE — PLAN OF CARE
Problem: Adult Inpatient Plan of Care  Goal: Plan of Care Review  Outcome: Progressing  Flowsheets (Taken 6/18/2025 0908)  Progress: improving  Plan of Care Reviewed With: patient     Problem: Adult Inpatient Plan of Care  Goal: Patient-Specific Goal (Individualized)  Outcome: Progressing  Flowsheets (Taken 6/18/2025 0908)  Anxieties, Fears or Concerns: Pt verbalizes need for ride home, social work notified, pt to discharge after meeting with social work.   Pt to be discharged home. Belongings retrieved from safe. Discharge paperwork reviewed.

## 2025-06-18 NOTE — NURSING NOTE
Discharge instructions given, no questions at this time. Patient refusing to sign discharge form until seen by social work. IV removed and tele box removed.

## 2025-06-18 NOTE — DISCHARGE SUMMARY
Inpatient Discharge Summary    BRIEF OVERVIEW  Admitting Provider:  H&P Notes 5/19/2025 to 6/18/2025             Date of Service Author Author Type Status Note Type File Time    06/15/25 0076 Armida Brasher MD Resident Attested H&P 06/15/25 1612            Attending Provider: Marisabel Santana MD Attending phys phone: [phone]  Primary Care Physician at Discharge: Unable, To Obtain Pcp None    Admission Date: 6/14/2025     Discharge Date: 6/18/2025    Primary Discharge Diagnosis  SBO (small bowel obstruction) (CMS/HCC)    Secondary Discharge Diagnosis  Active Hospital Problems    Diagnosis Date Noted    SBO (small bowel obstruction) (CMS/HCC) 06/15/2025      Resolved Hospital Problems   No resolved problems to display.       DETAILS OF HOSPITAL STAY    Operative Procedures Performed      Consults:   Consult Notes 5/19/2025 to 6/18/2025             Date of Service Author Author Type Status Note Type File Time    06/16/25 1343 Vero Hinds, RD Registered Dietitian Signed Consults 06/16/25 1351            Consult Orders During Admission:  None     Imaging  FLUOROSCOPY SMALL BOWEL STUDY  Result Date: 6/17/2025  IMPRESSION: Normal transit time of contrast into the colostomy bag within 1 hour.     X-RAY ABDOMEN 1 VIEW  Result Date: 6/16/2025  IMPRESSION: Nasogastric tube positioned within the stomach.    X-RAY ABDOMEN 1 VIEW  Result Date: 6/15/2025  IMPRESSION: NG tube is seen overlying the left melany-abdominal region.  Correlate clinically with function.  The CT evaluation does demonstrate a vertically oriented stomach     CT ABDOMEN PELVIS WITH IV CONTRAST  Result Date: 6/15/2025  IMPRESSION: 1. Minimal bilateral hydroureteronephrosis without obvious obstructing stone, which could be related to chronic changes of ileal conduit. No obvious obstructing stone. Left-sided urothelial enhancement and focus of air in the left renal collecting system could be reactive or from ascending/gas-forming infection.  2. Prominent to mildly dilated small bowel loops are suspicious for small bowel obstruction from adhesions in the pelvis. Correlate with therapeutic small bowel study or imaging follow-up. 3. Postoperative changes in the pelvis after cystectomy. Top normal to minimally enlarged retroperitoneal/upper abdominal lymph nodes. Correlate with prior imaging to confirm stability. 4. Mild dilatation with the external biliary tree measuring up to 1.1 cm, likely related to patient's age given normal LFTs. Gallstone or polyp at gallbladder fundus 1.1 cm. Recommend contrast enhanced abdominal MRI with MRCP on a non emergent outpatient basis. 5. Slightly heterogeneous/sclerotic appearance of the osseous structures is nonspecific and favored to be related to osteopenia or other chronic changes. Correlate with patient clinical factors and prior imaging to exclude infiltrative processes/metastases.      Presenting Problem/History of Present Illness  SBO (small bowel obstruction) (CMS/HCC) [K56.609]     From initial H&P:  60 y.o. female w/ PMHx of cervical CA s/p chemorads (15yrs remission) and PSHx of ex lap, GHAZALA, colostomy and ileal conduit creation (7/2024, cancer treatment centers), for active vaginal, vesicovaginal, urethrovaginal fistulas and no other significant past medical history presents to the ED with sudden onset abdominal pain, nausea/vomiting on 6/14. CT with dilated loops of small bowel and mild gastric dilation.  Although she reports regular bowel function, no gas noted since symptom onset.      Exam on Day of Discharge  Patient seen and examined on day of discharge.    General appearance: alert, appears stated age, cooperative  Pulm: No increased work of breathing  Heart: Normal rate and normotensive  Abdomen: soft, non-tender, non-distended Stoma w/ semisolid stool  Extremities: extremities warm and well-perfused.  Skin: Skin color, texture, turgor normal.  Neurologic: Grossly normal. Moves all four extremities  "spontaneously.      Hospital Course    The patient was admitted on 6/14 with concern for partial SBO. She was decompressed with an NGT and her pain was treated as needed. On HD 1 she was recommended a small bowel follow through to evaluate obstruction. She refused the study and removed her own nasogastric tube. This was replaced, but she removed the tube again. On HD 2 she consented to SBFT that showed normal transit time with prompt emptying of contrast into her colostomy within 1 hour. She had return of bowel function with liquid in her ostomy. She was advanced to a clears then regular diet. She was cleared for discharge but refused discharge. On the AM of HD 3 she was feeling much better. Her pain had resolved, and she was ambulating independently. Her colostomy was functioning with her normal semi-soft stool. She was ordered heparin for dvt prophylaxis during her hpsitalization which she intermittently refused. She was discharged with as needed follow up.    Discharge Orders     Medication List        CONTINUE taking these medications      acetaminophen 500 mg tablet  Commonly known as: TYLENOL  Take 500 mg by mouth every 6 (six) hours as needed for pain.  Dose: 500 mg     methIMAzole 5 mg tablet  Commonly known as: TAPAZOLE  Take 5 mg by mouth 2 (two) times a day.  Dose: 5 mg                Instructions for after discharge       Call provider for:  persistent nausea or vomiting      Call provider for:  severe uncontrolled pain      Call provider for:  temperature >100.4      Discharge Diet      Diet Type / Texture: Regular    Normal Activity as Tolerated      Review additional discharge directions in \"Instructions\" section on the last page              Outpatient Follow-Ups  Encounter Information    This patient does not currently have any appointments scheduled.       Referrals:  No orders of the defined types were placed in this encounter.      Discharge Disposition  Disposition:    Destination:        Code " Status at Discharge: Full Code  Physician Order for Life-Sustaining Treatment Document Status        No documents found

## 2025-06-18 NOTE — PLAN OF CARE
Care Coordination Discharge Plan Summary    Admission Assessment Summary    General Information  Readmission Within the last 30 days: no previous admission in last 30 days  Does patient have a : No  Patient-Specific Goals (include timeframe): Patient goal to tolerate regular diet with absemce of nausea.    Living Arrangements  Arrived From: home  Current Living Arrangements: home  People in Home: parent(s)  Home Accessibility: stairs to enter home (Group), stairs within home (Group)  Living Arrangement Comments: pt lives with mother in [facility] with 4 MANDY, bathroom on 1st floor. pt's b/b on 2nd floor    Social Drivers of Health - Screenings  Housing Stability  In the last 12 months, was there a time when you were not able to pay the mortgage or rent on time?: No  In the past 12 months, how many times have you moved where you were living?: 0  At any time in the past 12 months, were you homeless or living in a shelter (including now)?: No  Utility Access  In the past 12 months has the electric, gas, oil, or water company threatened to shut off services in your home?: No  Transportation Needs  In the past 12 months, has lack of transportation kept you from medical appointments or from getting medications?: No  In the past 12 months, has lack of transportation kept you from meetings, work, or from getting things needed for daily living?: No    Functional Status Prior to Admission  Assistive Device/Animal Currently Used at Home: canprecious martinez  Functional Status Comments: ambulates independently with cane  IADL Comments: indep    Discharge Needs Assessment    Concerns to be Addressed:    Current Discharge Risk: homeless, none (Pt reports to have unstable housing)  Anticipated Changes Related to Illness: none    Discharge Plan Summary    Patient Choice  Offered/Gave Vendor List: no       Concerns / Comments: This morning, FRANCISCA Loera contacted  regarding pt's request to speak with a  about  "transportation needs. When SW arrived to the room, pt reported to need assistance with finding  \"a residence\". Pt reported she has had unstable housing for a significant period of time. Prior to admission, pt was staying in a motel in [place] from March until the end of May. Pt stated the funding for this motel ([facility] [address], [place] [address]) came from the [place] Board of . She provided the contact information for SW Dann Harrington [phone]  and indicated he had helped her enter this motel. Per pt, the motel was subsequenlty inspected, there was a bug infestation, and pt was told she had to leave. From there, pt went to her mother's house in [place]. Pt told me she is able to d/c there while she continues to work on permanent housing. Pt confirmed she has [place] State ID but wants to relocate to [place]. SW explained she will need to become a [place] resident to move forward with this plan. Pt acknowledged same. Pt declined SW reaching out to [facility] because she does not intend to return to [place]. Pt was provided with Lyft voucher, IMM Letter reviewed. DC to mother's home today.    Discharge Plan:  Disposition/Destination: Home  / Home       Connection to Community     Community Resources:      Discharge Transportation:  Is Out of Hospital DNR needed at Discharge:    Does patient need discharge transport? Yes  Discharge Transportation Vendor: other (see comment)  What day is the transport expected?: 06/18/25       "

## 2025-08-31 ENCOUNTER — HOSPITAL ENCOUNTER (EMERGENCY)
Facility: HOSPITAL | Age: 60
Discharge: LEFT BEFORE DISCHARGE | End: 2025-08-31
Payer: COMMERCIAL

## 2025-08-31 ENCOUNTER — APPOINTMENT (EMERGENCY)
Dept: RADIOLOGY | Facility: HOSPITAL | Age: 60
End: 2025-08-31
Payer: COMMERCIAL

## 2025-08-31 VITALS
DIASTOLIC BLOOD PRESSURE: 86 MMHG | RESPIRATION RATE: 18 BRPM | WEIGHT: 103.8 LBS | BODY MASS INDEX: 20.38 KG/M2 | SYSTOLIC BLOOD PRESSURE: 157 MMHG | OXYGEN SATURATION: 99 % | HEART RATE: 83 BPM | HEIGHT: 60 IN | TEMPERATURE: 97.8 F

## 2025-08-31 PROCEDURE — 72125 CT NECK SPINE W/O DYE: CPT

## 2025-08-31 PROCEDURE — 70450 CT HEAD/BRAIN W/O DYE: CPT

## 2025-09-01 ENCOUNTER — TELEPHONE (OUTPATIENT)
Dept: EMERGENCY MEDICINE | Facility: HOSPITAL | Age: 60
End: 2025-09-01
Payer: COMMERCIAL